# Patient Record
Sex: FEMALE | Race: WHITE | URBAN - METROPOLITAN AREA
[De-identification: names, ages, dates, MRNs, and addresses within clinical notes are randomized per-mention and may not be internally consistent; named-entity substitution may affect disease eponyms.]

---

## 2022-08-02 ENCOUNTER — OFFICE VISIT (OUTPATIENT)
Dept: ORTHOPEDIC SURGERY | Age: 82
End: 2022-08-02
Payer: MEDICARE

## 2022-08-02 VITALS — BODY MASS INDEX: 31.45 KG/M2 | WEIGHT: 156 LBS | HEIGHT: 59 IN

## 2022-08-02 DIAGNOSIS — Z96.651 S/P TOTAL KNEE ARTHROPLASTY, RIGHT: ICD-10-CM

## 2022-08-02 DIAGNOSIS — M25.562 LEFT KNEE PAIN, UNSPECIFIED CHRONICITY: Primary | ICD-10-CM

## 2022-08-02 DIAGNOSIS — M17.12 PRIMARY OSTEOARTHRITIS OF LEFT KNEE: ICD-10-CM

## 2022-08-02 PROBLEM — E55.9 VITAMIN D DEFICIENCY: Status: ACTIVE | Noted: 2021-12-20

## 2022-08-02 PROBLEM — I10 ESSENTIAL HYPERTENSION: Status: ACTIVE | Noted: 2018-10-09

## 2022-08-02 PROBLEM — M81.0 AGE-RELATED OSTEOPOROSIS WITHOUT CURRENT PATHOLOGICAL FRACTURE: Status: ACTIVE | Noted: 2021-06-17

## 2022-08-02 PROCEDURE — 1123F ACP DISCUSS/DSCN MKR DOCD: CPT | Performed by: ORTHOPAEDIC SURGERY

## 2022-08-02 PROCEDURE — 1036F TOBACCO NON-USER: CPT | Performed by: ORTHOPAEDIC SURGERY

## 2022-08-02 PROCEDURE — 99204 OFFICE O/P NEW MOD 45 MIN: CPT | Performed by: ORTHOPAEDIC SURGERY

## 2022-08-02 PROCEDURE — G8417 CALC BMI ABV UP PARAM F/U: HCPCS | Performed by: ORTHOPAEDIC SURGERY

## 2022-08-02 PROCEDURE — 1090F PRES/ABSN URINE INCON ASSESS: CPT | Performed by: ORTHOPAEDIC SURGERY

## 2022-08-02 PROCEDURE — G8400 PT W/DXA NO RESULTS DOC: HCPCS | Performed by: ORTHOPAEDIC SURGERY

## 2022-08-02 PROCEDURE — G8427 DOCREV CUR MEDS BY ELIG CLIN: HCPCS | Performed by: ORTHOPAEDIC SURGERY

## 2022-08-02 RX ORDER — OMEPRAZOLE 20 MG/1
20 CAPSULE, DELAYED RELEASE ORAL DAILY
COMMUNITY

## 2022-08-02 RX ORDER — FAMOTIDINE 20 MG/1
20 TABLET, FILM COATED ORAL DAILY
COMMUNITY

## 2022-08-02 RX ORDER — AMLODIPINE BESYLATE 5 MG/1
TABLET ORAL
COMMUNITY
Start: 2022-07-08

## 2022-08-02 RX ORDER — HYDROXYZINE HYDROCHLORIDE 25 MG/1
TABLET, FILM COATED ORAL
COMMUNITY
Start: 2022-07-15

## 2022-08-02 RX ORDER — ASCORBIC ACID 500 MG
500 TABLET ORAL DAILY
COMMUNITY

## 2022-08-02 RX ORDER — SIMVASTATIN 20 MG
20 TABLET ORAL
COMMUNITY

## 2022-08-02 RX ORDER — ESTRADIOL 0.1 MG/G
1 CREAM VAGINAL
COMMUNITY
Start: 2022-04-13 | End: 2022-10-10

## 2022-08-02 NOTE — PROGRESS NOTES
Name: Kira Garcia  YOB: 1940  Gender: female  MRN: [id number]    CC:   Chief Complaint   Patient presents with    Knee Pain     Left knee pain R TKA 7-5-2013         HPI: Kira Garcia is a 80 y.o. female with a PMHx of HTN, HLD, osteopenia/osteoporosis, and s/p right TKA in 2013  here for evaluation of left knee pain. The pain has been present for little over 8 months and is becoming worse. The pain is primarily on the Lateral side. she describes the pain as dull, aching, throbbing, intermittent catching, and intermittently feels a burning pain over the lateral side that radiates to about the mid shin  She is also complaining of warmth over the lateral side of the knee. She denies any redness, drainage, or any wounds  The pain is worse with going up and/or down stairs, rising after sitting, standing, and walking  she denies numbness and tingling down the leg. Patient states she had a cortisone shot in April of this year with her PCP that helped about 2 weeks  Treatment so far has been activity modification, Tylenol, ibuprofen, cortisone injections, and voltaren gel with little relief. Regarding the right knee she is still very pleased with her results thus far and has no new complaints regarding her knee. Allergies   Allergen Reactions    Lisinopril Cough    Losartan Cough    Rosuvastatin Myalgia    Simvastatin Myalgia         Review of Systems:  As per HPI. Pertinent positives and negatives are addressed with the patient, particularly those related to musculoskeletal concerns. Non-orthopaedic concerns were referred back to the primary care physician. PHYSICAL EXAMINATION:   The patient appears their stated age and they are in no distress. Ht 4' 11\" (1.499 m)   Wt 156 lb (70.8 kg)   BMI 31.51 kg/m²       The lower extremities are as described below. Circulation is normal with palpable pedal pulses bilaterally and no edema.   There is no lymph adenopathy in the popliteal or malleolar region. The skin is without stasis disease distally bilaterally. Sensation is intact to light touch bilaterally. There is mild tenderness to palpation over the lateral joint line of the left knee(s)  The gait is noted to be with a slight trendelenburg and antalgia  Range of motion is 0-120 degrees on the right and 0-95 degrees on the left. bilateral knee: There is 2mm of anterior/posterior translation and 2mm of medial/lateral instability bilaterally. There is 2 degrees of varus alignment in the bilateral knee. Limb lengths are equal.  Quadriceps strength is excellent. Their judgment and insight are normal.  They are oriented to time, place and person. Their memory is good and the mood and affect appropriate. X-RAY:   AP, lateral, sunrise, and 45 degree  views of the bilateral knees are reviewed. On the left knee there is some lateral joint space loss, with little eburnated bone, and osteophyte formation present. X-ray impression:  Moderate osteoarthropathy of the left knee  3 views of the right knee are reveal good bone prosthetic interface with no suggestion of progressive lucency. X-ray impression:  Stable right total knee arthroplasty      IMPRESSION:     ICD-10-CM    1. Left knee pain, unspecified chronicity  M25.562 XR Knee Bilateral Standard Extended VW      2. S/P total knee arthroplasty, right  Z96.651 XR Knee Bilateral Standard Extended VW      3. Primary osteoarthritis of left knee  M17.12 Ambulatory referral to Physical Therapy          RECOMMENDATIONS:    Reviewed x-ray findings with the patient. This patient's clinical history and physical exam is consistent with osteoarthritis of the left knee(s). We discussed the natural history of this condition as a degenerative process that causes inflammation of the joints due to a breakdown of cartilage, the hard, thick tissue that cushions the joints.  We discussed that osteoarthritis never completely resolves on its own and symptoms including pain, stiffness, and swelling may wax and wane as the condition progresses. She understands that conservative treatments typically include activity modification, NSAIDs and physical therapy. Oral and/or steroid injections into the joint could be considered in resistant scenarios. Viscosupplementation injections may also be useful as a temporary cartilage replacement in certain patients. I advised to avoid any prolonged bedrest and to try to maintain ADLs as much as possible. The patient was counseled to follow up with me should she develop any worsening symptoms that begin to limit activities of daily living.     ----The patient will be treated observantly with self directed symptomatic care. Instructions were given to follow up if there is any neurologic or functional decline.  ---- Physical Therapy was prescribed and will include stretching, strengthening and modalities to promote blood flow, flexibility and core strengthening.  ----TKA - Today we also discussed left knee replacement surgery. They are aware of the 1% risk of infection. They were also informed of the possibility of stroke, heart attack and blood clot; any of which could result in further hospitalization or death. I reviewed the procedure as well as the pre and post-operative process at length and written information was provided for further review. We are planning a Tanisha Makoplasty Robot Assisted TKA - The patient is aware that a preoperative 3D CT Scan is medically necessary for pre-op planning using the [employer]. This will be scheduled and arranged to be done prior to surgery.        4--this is a chronic illness/condition with exacerbation

## 2022-08-15 NOTE — PROGRESS NOTES
[address]  [address] [facility] [address]  Dept: [phone]      Physical Therapy Initial Assessment   Goes by [address]: Today is 1/20 visits [facility])    Total Timed Procedure Codes: 30 min, Total Time: 50 min    Referring MD: Alexis Dunaway., *  Referral for: Left knee pain due to OA  Onset date: 2/1/2022    Diagnosis:     ICD-10-CM    1. Chronic pain of left knee  M25.562     G89.29       2. Joint stiffness of left lower leg  M25.662       3. Left leg swelling  M79.89       4. Difficulty walking  R26.2       5. Impaired mobility and ADLs  Z74.09     Z78.9       6. Muscle weakness  M62.81              Therapy precautions:None  Co-morbidities affecting plan of care: Right TKA (2013), HTN, osteoporosis. PERTINENT MEDICAL HISTORY     Past medical and surgical history:   No past medical history on file. No past surgical history on file. Medications: reviewed in chart   Allergies: Allergies   Allergen Reactions    Lisinopril Cough    Losartan Cough    Rosuvastatin Myalgia    Simvastatin Myalgia          SUBJECTIVE     Chief complaints/history of injury: Patient presents to therapy with chronic left knee pain due to OA. She has TKA scheduled for January. She states she had cortisone injection in April with very little change in her symptoms. She has pain left knee. She has stiffness left knee. She uses a cane to ambulate outside of her home. She c/o swelling left knee. She does not recall any injury to left knee. Received previous outpatient therapy? No    Pain Assessment:  Pain location: left knee    Average Pain/symptom intensity (0-10 scale)  Last 24 hours: 6/10  Last week (1-7 days): 8/10  How often do you feel symptoms?  Constant (% of time)  Description: burning, sharp, and stabbing  Aggravating factors: prolonged standing, transferring sit-stand, walking, and lying in bed  Alleviating factors:  none    Neuro screen: denies numbness, tingling, and radiating pain    Social/Functional Hx: How would you rate your overall health? very good  Pt lives with independent spouse in a(n) 1 story house with ramped entrance. Current DME: straight cane  Work Status: Retired   Sleep: minimally disturbed  PLOF & Social Hx/Interests: Independent and active without physical limitations and participated in walking in park  How much have your symptoms interfered with daily activities? Quite a bit  Current level of function:  able to do household chores and grocery shopping with help from her spouse. She has to use cane to ambulate in park . She has increased pain left knee with activity. Patient Stated Goals: Independent with HEP. OBJECTIVE     Functional Outcome Questionnaire: Lower Extremity Functional Scale: 49/80= 61% function (39% deficit)  Observation:   Posture: Knee valgus left  Swelling/Edema: moderate left                Palpation: no pain with palpation      A/PROM Measures:     Right Left Comment   Knee Flexion 130A 110A    Knee Extension 0 0A                     Strength/MMT (0-5 Scale):      Right Left Comment   Knee Flexion 4 -3    Knee Extension 4 -3          Hip Flexion 4 +3    Hip Extension 4 +3    Hip Abduction 4 +3    Hip ER      Hip IR      Ankle DF      Ankle PF                  Special Tests/Function:   Gait: assistive device used: cane  antalgic left  Stair management:step-to leading right ascending, step-to leading left descending      Treatment provided today consisted of initial evaluation followed by: Therapeutic exercise (48448) x 20 min to address ROM/strength deficits and to develop an initial HEP as noted below. Included:   SAQ 20x  Quad sets 20x  SLR 10x2  Seated HS stretch  LAQ 20x  Step stretch for knee flexion 10x    Gait training (11229) x 10 min to address mechanics, proper step length and weight shifting to improve household and community mobility as well as overall safety with ADLs.  (Training with cane, stairs non-reicprocating and cane fitting)        Patient Education on the condition/pathology, involved anatomy, and exercise rationale. CLINICAL DECISION MAKING/ASSESSMENT     Personal Factors/co-morbidities affecting POC (1-2 Medium/3+High): coping styles/strategies  habits/routines  past/current experiences   Problem List: (1-2 Low/ 3 Medium/ 4+ High) Pain  Localized swelling/edema  ROM limitations  Hypermobility/instability  Strength deficits  Impaired transfers  Impaired gait  Decreased endurance/activity Tolerance  ADL/functional limitations/modifications  Restricted social activity  Restricted recreational participation  Decreased Lind with Home Exercise Program  Difficulty sleeping    Clinical decision making: low complexity with therapist able to easily and confidently determine and predict expectations and future outcomes for the POC. Prognosis: excellent   Benefits and precautions of treatment explained to patient. Renata Jett is a 80 y.o. female who presents to therapy today with stable clinical presentation (low complexity) related to left knee pain due to OA. Pt would benefit from skilled physical therapy services to address the deficits noted above for return to prior level of function. PLAN OF CARE     Effective Dates: 8/16/2022 TO 8/16/22. Frequency/Duration: 1-2 sessions in the next 30 Day(s)  Interventions may include but are not limited to: Therapeutic exercise to develop ROM, strength, endurance and flexibility  Therapeutic activities using dynamic activities to improve function  Gait training to address mechanics, proper step length and weight shifting to improve household and community mobility as well as overall safety with ADLs  Home exercise program (HEP) development  Patient is independent with HEP and discharged from PT    The referring physician has reviewed and approved this evaluation and plan of care as noted by the electronic signature attached to note.     GOALS Long term goals to be met by 8/16/22 : Independent with HEP-MET    Amplience  Access Code: [email or web address]  URL: [email or web address]  Date: 08/16/2022  Prepared by:  Sergo Ott    Exercises  Supine Quad Set - 1 x daily - 7 x weekly - 1 sets - 20 reps - hold 5 seconds hold  Supine Active Straight Leg Raise - 1 x daily - 7 x weekly - 2 sets - 10 reps - don't hold  Seated Long Arc Quad - 1 x daily - 7 x weekly - 1 sets - 20 reps - don't hold  Supine Knee Extension Strengthening - 1 x daily - 7 x weekly - 1 sets - 20 reps - don't hold  Standing Knee Flexion Stretch on Step - 2 x daily - 7 x weekly - 1 sets - 10 reps - 10 seconds hold  Seated Hamstring Stretch with Chair - 2 x daily - 7 x weekly - 1 sets - 1 reps - 1 minute hold

## 2022-08-16 ENCOUNTER — OFFICE VISIT (OUTPATIENT)
Dept: ORTHOPEDIC SURGERY | Age: 82
End: 2022-08-16
Payer: MEDICARE

## 2022-08-16 DIAGNOSIS — G89.29 CHRONIC PAIN OF LEFT KNEE: Primary | ICD-10-CM

## 2022-08-16 DIAGNOSIS — R26.2 DIFFICULTY WALKING: ICD-10-CM

## 2022-08-16 DIAGNOSIS — M25.662 JOINT STIFFNESS OF LEFT LOWER LEG: ICD-10-CM

## 2022-08-16 DIAGNOSIS — M25.562 CHRONIC PAIN OF LEFT KNEE: Primary | ICD-10-CM

## 2022-08-16 DIAGNOSIS — M62.81 MUSCLE WEAKNESS: ICD-10-CM

## 2022-08-16 DIAGNOSIS — M79.89 LEFT LEG SWELLING: ICD-10-CM

## 2022-08-16 DIAGNOSIS — Z74.09 IMPAIRED MOBILITY AND ADLS: ICD-10-CM

## 2022-08-16 DIAGNOSIS — Z78.9 IMPAIRED MOBILITY AND ADLS: ICD-10-CM

## 2022-08-16 PROCEDURE — 97110 THERAPEUTIC EXERCISES: CPT | Performed by: PHYSICAL THERAPIST

## 2022-08-16 PROCEDURE — 97161 PT EVAL LOW COMPLEX 20 MIN: CPT | Performed by: PHYSICAL THERAPIST

## 2022-08-16 PROCEDURE — 97116 GAIT TRAINING THERAPY: CPT | Performed by: PHYSICAL THERAPIST

## 2022-11-17 DIAGNOSIS — M17.12 PRIMARY OSTEOARTHRITIS OF LEFT KNEE: Primary | ICD-10-CM

## 2022-12-16 RX ORDER — SODIUM CHLORIDE 0.9 % (FLUSH) 0.9 %
5-40 SYRINGE (ML) INJECTION EVERY 12 HOURS SCHEDULED
OUTPATIENT
Start: 2022-12-16

## 2022-12-16 RX ORDER — SODIUM CHLORIDE 0.9 % (FLUSH) 0.9 %
5-40 SYRINGE (ML) INJECTION PRN
OUTPATIENT
Start: 2022-12-16

## 2022-12-16 RX ORDER — SODIUM CHLORIDE 9 MG/ML
INJECTION, SOLUTION INTRAVENOUS PRN
OUTPATIENT
Start: 2022-12-16

## 2022-12-16 RX ORDER — ACETAMINOPHEN 500 MG
1000 TABLET ORAL ONCE
OUTPATIENT
Start: 2022-12-16 | End: 2022-12-16

## 2022-12-20 ENCOUNTER — HOSPITAL ENCOUNTER (OUTPATIENT)
Dept: SURGERY | Age: 82
Discharge: HOME | End: 2022-12-23
Payer: MEDICARE

## 2022-12-20 ENCOUNTER — HOSPITAL ENCOUNTER (OUTPATIENT)
Dept: REHABILITATION | Age: 82
Discharge: HOME | End: 2022-12-23
Payer: MEDICARE

## 2022-12-20 VITALS
OXYGEN SATURATION: 94 % | HEART RATE: 88 BPM | DIASTOLIC BLOOD PRESSURE: 58 MMHG | BODY MASS INDEX: 28.47 KG/M2 | SYSTOLIC BLOOD PRESSURE: 153 MMHG | TEMPERATURE: 97.7 F | HEIGHT: 60 IN | WEIGHT: 145 LBS

## 2022-12-20 DIAGNOSIS — M17.12 PRIMARY OSTEOARTHRITIS OF LEFT KNEE: Primary | ICD-10-CM

## 2022-12-20 LAB
ANION GAP SERPL CALC-SCNC: 5 MMOL/L (ref 2–11)
APTT PPP: 37.6 SEC (ref 24.5–34.2)
BACTERIA SPEC CULT: NORMAL
BASOPHILS # BLD: 0 K/UL (ref 0–0.2)
BASOPHILS NFR BLD: 0 % (ref 0–2)
BUN SERPL-MCNC: 7 MG/DL (ref 8–23)
CALCIUM SERPL-MCNC: 10.1 MG/DL (ref 8.3–10.4)
CHLORIDE SERPL-SCNC: 105 MMOL/L (ref 101–110)
CO2 SERPL-SCNC: 28 MMOL/L (ref 21–32)
CREAT SERPL-MCNC: 0.61 MG/DL (ref 0.6–1)
DIFFERENTIAL METHOD BLD: NORMAL
EKG ATRIAL RATE: 74 BPM
EKG DIAGNOSIS: NORMAL
EKG P AXIS: 64 DEGREES
EKG P-R INTERVAL: 194 MS
EKG Q-T INTERVAL: 404 MS
EKG QRS DURATION: 70 MS
EKG QTC CALCULATION (BAZETT): 448 MS
EKG R AXIS: -42 DEGREES
EKG T AXIS: 35 DEGREES
EKG VENTRICULAR RATE: 74 BPM
EOSINOPHIL # BLD: 0.1 K/UL (ref 0–0.8)
EOSINOPHIL NFR BLD: 1 % (ref 0.5–7.8)
ERYTHROCYTE [DISTWIDTH] IN BLOOD BY AUTOMATED COUNT: 13.2 % (ref 11.9–14.6)
EST. AVERAGE GLUCOSE BLD GHB EST-MCNC: 108 MG/DL
GLUCOSE SERPL-MCNC: 101 MG/DL (ref 65–100)
HBA1C MFR BLD: 5.4 % (ref 4.8–5.6)
HCT VFR BLD AUTO: 42.2 % (ref 35.8–46.3)
HGB BLD-MCNC: 13.6 G/DL (ref 11.7–15.4)
IMM GRANULOCYTES # BLD AUTO: 0 K/UL (ref 0–0.5)
IMM GRANULOCYTES NFR BLD AUTO: 0 % (ref 0–5)
INR PPP: 0.9
LYMPHOCYTES # BLD: 1.4 K/UL (ref 0.5–4.6)
LYMPHOCYTES NFR BLD: 19 % (ref 13–44)
MCH RBC QN AUTO: 30.6 PG (ref 26.1–32.9)
MCHC RBC AUTO-ENTMCNC: 32.2 G/DL (ref 31.4–35)
MCV RBC AUTO: 95 FL (ref 82–102)
MONOCYTES # BLD: 0.5 K/UL (ref 0.1–1.3)
MONOCYTES NFR BLD: 7 % (ref 4–12)
NEUTS SEG # BLD: 5.4 K/UL (ref 1.7–8.2)
NEUTS SEG NFR BLD: 73 % (ref 43–78)
NRBC # BLD: 0 K/UL (ref 0–0.2)
PLATELET # BLD AUTO: 278 K/UL (ref 150–450)
PMV BLD AUTO: 11.2 FL (ref 9.4–12.3)
POTASSIUM SERPL-SCNC: 3.7 MMOL/L (ref 3.5–5.1)
PROTHROMBIN TIME: 12.4 SEC (ref 12.6–14.3)
RBC # BLD AUTO: 4.44 M/UL (ref 4.05–5.2)
SERVICE CMNT-IMP: NORMAL
SODIUM SERPL-SCNC: 138 MMOL/L (ref 133–143)
WBC # BLD AUTO: 7.3 K/UL (ref 4.3–11.1)

## 2022-12-20 PROCEDURE — 93005 ELECTROCARDIOGRAM TRACING: CPT | Performed by: ANESTHESIOLOGY

## 2022-12-20 PROCEDURE — 97161 PT EVAL LOW COMPLEX 20 MIN: CPT

## 2022-12-20 PROCEDURE — 94760 N-INVAS EAR/PLS OXIMETRY 1: CPT

## 2022-12-20 PROCEDURE — 85025 COMPLETE CBC W/AUTO DIFF WBC: CPT

## 2022-12-20 PROCEDURE — 80048 BASIC METABOLIC PNL TOTAL CA: CPT

## 2022-12-20 PROCEDURE — 83036 HEMOGLOBIN GLYCOSYLATED A1C: CPT

## 2022-12-20 PROCEDURE — 85730 THROMBOPLASTIN TIME PARTIAL: CPT

## 2022-12-20 PROCEDURE — 87641 MR-STAPH DNA AMP PROBE: CPT

## 2022-12-20 PROCEDURE — 85610 PROTHROMBIN TIME: CPT

## 2022-12-20 RX ORDER — ASPIRIN 81 MG/1
81 TABLET ORAL DAILY
COMMUNITY

## 2022-12-20 RX ORDER — TRIAMCINOLONE ACETONIDE 1 MG/G
CREAM TOPICAL AS NEEDED
COMMUNITY

## 2022-12-20 ASSESSMENT — KOOS JR
RISING FROM SITTING: 3
HOW SEVERE IS YOUR KNEE STIFFNESS AFTER FIRST WAKING IN MORNING: 3
BENDING TO THE FLOOR TO PICK UP OBJECT: 2
KOOS JR TOTAL INTERVAL SCORE: 39.625
TWISING OR PIVOTING ON KNEE: 3
GOING UP OR DOWN STAIRS: 3
STRAIGHTENING KNEE FULLY: 3
STANDING UPRIGHT: 2

## 2022-12-20 ASSESSMENT — PAIN DESCRIPTION - ORIENTATION: ORIENTATION: LEFT

## 2022-12-20 ASSESSMENT — PULMONARY FUNCTION TESTS
FEV1 (LITERS): 0.88
FEV1 (%PREDICTED): 63

## 2022-12-20 ASSESSMENT — PAIN SCALES - GENERAL: PAINLEVEL_OUTOF10: 8

## 2022-12-20 ASSESSMENT — PAIN DESCRIPTION - LOCATION: LOCATION: KNEE

## 2022-12-20 NOTE — PERIOP NOTE
Labs done today within anesthesia protocols - except PTT. Will have anesthesia review.       Latest Reference Range & Units 12/20/22 12:25   Sodium 133 - 143 mmol/L 138   Potassium 3.5 - 5.1 mmol/L 3.7   Chloride 101 - 110 mmol/L 105   CO2 21 - 32 mmol/L 28   BUN,BUNPL 8 - 23 MG/DL 7 (L)   Creatinine 0.6 - 1.0 MG/DL 0.61   Anion Gap 2 - 11 mmol/L 5   Est, Glom Filt Rate >60 ml/min/1.73m2 >60   Glucose, Random 65 - 100 mg/dL 101 (H)   CALCIUM, SERUM, 477409 8.3 - 10.4 MG/DL 10.1   Hemoglobin A1C 4.8 - 5.6 % 5.4   eAG (mg/dL) mg/dL 108   WBC 4.3 - 11.1 K/uL 7.3   RBC 4.05 - 5.2 M/uL 4.44   Hemoglobin Quant 11.7 - 15.4 g/dL 13.6   Hematocrit 35.8 - 46.3 % 42.2   MCV 82.0 - 102.0 FL 95.0   MCH 26.1 - 32.9 PG 30.6   MCHC 31.4 - 35.0 g/dL 32.2   MPV 9.4 - 12.3 FL 11.2   RDW 11.9 - 14.6 % 13.2   Platelet Count 412 - 450 K/uL 278   Absolute Mono # 0.1 - 1.3 K/UL 0.5   Eosinophils % 0.5 - 7.8 % 1   Basophils Absolute 0.0 - 0.2 K/UL 0.0   Differential Type -   AUTOMATED   Seg Neutrophils 43 - 78 % 73   Segs Absolute 1.7 - 8.2 K/UL 5.4   Lymphocytes 13 - 44 % 19   Absolute Lymph # 0.5 - 4.6 K/UL 1.4   Monocytes 4.0 - 12.0 % 7   Absolute Eos # 0.0 - 0.8 K/UL 0.1   Basophils 0.0 - 2.0 % 0   Immature Granulocytes 0.0 - 5.0 % 0   Nucleated Red Blood Cells 0.0 - 0.2 K/uL 0.00   Absolute Immature Granulocyte 0.0 - 0.5 K/UL 0.0   Prothrombin Time 12.6 - 14.3 sec 12.4 (L)   INR -   0.9   PTT 24.5 - 34.2 SEC 37.6 (H)   MSSA/MRSA SCREEN BY PCR  Rpt   (L): Data is abnormally low  (H): Data is abnormally high  Rpt: View report in Results Review for more information

## 2022-12-20 NOTE — PERIOP NOTE
Patient verified name and . Order for consent is found in EHR and matches case posting; patient verified. Type 3 surgery, Joint camp assessment complete. Labs per surgeon: CBC,BMP, PT/PTT, mrsa swab, hgba1c ; results (processing)  Labs per anesthesia protocol: no additional  EKG:done today - will have anesthesia review. MRSA/MSSA swab collected; pharmacy to review and dose antibiotic as appropriate. Hospital approved surgical skin cleanser and instructions to return bottle on DOS given per hospital policy. Patient provided with handouts including Guide to Surgery, Pain Management, Hand Hygiene, Blood Transfusion Education, and [facility] Brochure. Patient answered medical/surgical history questions at their best of ability. All prior to admission medications documented in [facility]. Original medication prescription bottle not visualized during patient appointment. Patient instructed to hold all vitamins 3 weeks prior to surgery and NSAIDS 5 days prior to surgery. Patient teach back successful and patient demonstrates knowledge of instruction.

## 2022-12-20 NOTE — PERIOP NOTE
PLEASE CONTINUE TAKING ALL PRESCRIPTION MEDICATIONS UP TO THE DAY OF SURGERY UNLESS OTHERWISE DIRECTED BELOW. DISCONTINUE all vitamins, herbals and supplements 21 days prior to surgery. DISCONTINUE Non-Steriodal Anti-Inflammatory (NSAIDS) such as Advil, Ibuprofen, Motrin, Naproxen and Aleve 5 days prior to surgery. Home Medications to take  the day of surgery      Amlodipine     Pepcid if needed     Hydroxyzine if needed     Home Medications   to Hold     Hold Aspirin for 5 days prior to surgery. Comments   Take 1 capful of Miralax daily starting one week prior to surgery. On the day before surgery please take Acetaminophen 1000mg in the morning and then again before bed. You may substitute for Tylenol 650 mg. Please do not bring home medications with you on the day of surgery unless otherwise directed by your nurse. If you are instructed to bring home medications, please give them to your nurse as they will be administered by the nursing staff. If you have any questions, please call Shirlene Gurrola [phone]. A copy of this note was provided to the patient for reference.

## 2022-12-20 NOTE — PROGRESS NOTES
Katelynn Ball  : 1940  Primary: Medicare Part A [employer] B  Secondary: [address] at [facility]  [address], [place] [address] [place]  [phone]        Physical Therapy Prehab Evaluation Summary:2022   Time In/Out   PT Charge Capture  Episode     MEDICAL/REFERRING DIAGNOSIS: Unilateral primary osteoarthritis, left knee [M17.12]  REFERRING PHYSICIAN: Fabiola Sandhoff., *    ICD-10: Treatment Diagnosis:   Pain in Left Knee (M25.562)  Stiffness of Left Knee, Not elsewhere classified (M25.662)  Difficulty in walking, Not elsewhere classified (R26.2)  Other abnormalities of gait and mobility (R26.89)    DATE OF SURGERY: 23  Assessment:   COMMENTS:  pt presents with granddaughter with pain in left knee plans to return home with  post op day 1. Pt had right tka . PROBLEM LIST:   (Impacting functional limitations):  Ms. Yesica Gomez presents with the following lower extremity(s) problems:  Transfers  Gait  Strength  Range of Motion  Balance  Home Exercise Program  Pain INTERVENTIONS PLANNED:   (Benefits and precautions of physical therapy have been discussed with the patient.)  Home Exercise Program  Educational Discussion       GOALS: (Goals have been discussed and agreed upon with patient.)  Discharge Goals: Time Frame: 1 Day  Patient will demonstrate independence with a home exercise program designed to increase strength, range of motion, balance, coordination, functional technique, and pain control to minimize functional deficits and optimize patient for total joint replacement. Subjective:   Past Medical History/Comorbidities:   Ms. Yesica Gomez  has a past medical history of HTN (hypertension), Hyperlipidemia, and Urinary incontinence. Ms. Yesica Gomez  has a past surgical history that includes Appendectomy; Hysterectomy (Bilateral); Foot surgery (Bilateral); Knee Arthroplasty (Right);  Tonsillectomy; Colonoscopy; and Cardiac catheterization.     Allergies:  Lisinopril, Losartan, Rosuvastatin, and Simvastatin    Current Medications:  Refer to pre-assessment nursing note    Home Set-Up/Prior Level of Function:  Lives With: Spouse  Home Layout: One level  Home Access: Ramped entrance  Bathroom Shower/Tub: Tub/Shower unit  Home Equipment: Donney Proffer, rolling    Dominant Side:  Dominant Hand: : Right    Current Functional Status:   Ambulation:  [x] Independent  [] Walk Indoors Only  [] Walk Outdoors  [] Use Assistive Device  [] Use Wheelchair Only Dressing:  [x] [address] from Someone for:  [] Sock/Shoes  [] Pants  [] Everything   Bathing/Showering:   [x] Independent  [] Requires Assistance from Someone  [] Sponge Bath Only Household Activities:  [x] Routine house and yard work  [] Light Housework Only  [] None     Objective:   PAIN:   Pre-Treatment Pain  Pain Assessment: 0-10  Pain Level: 8  Pain Location: Knee  Pain Orientation: Left    Post Treatment: 8    Outcome Measure:   HOOS-JR:       KOOS-JR:  KOJr. LISANDRO Knee Survey Score: 19    LOWER EXTREMITY GROSS EVALUATION:  RIGHT LE   Within Functional Limits   Abnormal   Comments   Strength [x] []     Range of Motion [x] []        LEFT LE Within Functional Limits   Abnormal   Comments   Strength [x] []     Range of Motion [] []   [email or web address]     UPPER EXTREMITY GROSS EVALUATION:     Within Functional Limits   Abnormal   Comments   Strength [x] []    Range of Motion [x] []      SENSATION  Sensation [x]       COGNITION/  PERCEPTION: Intact Impaired (Comments):   Orientation [x]     Vision [x]     Hearing [x]     Cognition  [x]       TRANSFERS: I Mod I S SBA CGA Min Mod Max Total  NT x2 Comments:   Sit to Stand [x] [] [] [] [] [] [] [] [] [] []    Stand to Sit [x] [] [] [] [] [] [] [] [] [] []    Stand pivot [] [] [] [] [] [] [] [] [] [] []     [] [] [] [] [] [] [] [] [] [] []    I=Independent, Mod I=Modified Independent, S=Supervision, SBA=Standby Assistance, CGA=Contact Juan Schwab,   Min=Minimal Assistance, Mod=Moderate Assistance, Max=Maximal Assistance, Total=Total Assistance, NT=Not Tested    BALANCE: Good Fair+ Fair Fair- Poor NT Comments   Sitting Static [x] [] [] [] [] []    Sitting Dynamic [x] [] [] [] [] []              Standing Static [x] [] [] [] [] []    Standing Dynamic [x] [] [] [] [] []      Postural Assessment:   N/A    GAIT: I Mod I S SBA CGA Min Mod Max Total  NT x2 Comments:   Level of Assistance [x] [] [] [] [] [] [] [] [] [] []    Weightbearing Status       Distance  300 feet    Gait Quality Antalgic    DME None     Stairs      Ramp     I=Independent, Mod I=Modified Independent, S=Supervision, SBA=Standby Assistance, CGA=Contact Guard Assistance,   Min=Minimal Assistance, Mod=Moderate Assistance, Max=Maximal Assistance, Total=Total Assistance, NT=Not Tested    TREATMENT:   EVALUATION: LOW COMPLEXITY: (Untimed Charge)    TREATMENT PLAN:   Effective Dates: 12/20/2022 TO 12/20/2022. Treatment/Session Assessment:  Patient was instructed in PT- HEP to increase strength and ROM in LEs. Answered all questions. Frequency/Duration: Patient to continue to perform home exercise program at least twice per day up until her surgery. EDUCATION: Education Given To: Patient  Education Provided: Role of Therapy, Plan of Care, Home Exercise Program, Transfer Training  Education Method: Demonstration, Verbal  Education Outcome: Verbalized understanding, Demonstrated understanding    MEDICAL NECESSITY: Ms. Yas Fong is expected to optimize herlower extremity strength and ROM in preparation for joint replacement surgery. REASON FOR CONTINUED SERVICES: Achieve baseline assesment of musculoskeletal system, functional mobility and home environment. , educate in PT HEP in preparation for surgery, educate in hospital plan of care. COMPLIANCE WITH PROGRAM/EXERCISE: compliant most of the time, Will assess as treatment progresses.     TOTAL TREATMENT DURATION:   8400-9081    Regarding Annamarie Heard's therapy, I certify that the treatment plan above will be carried out by a therapist or under their direction.   Thank you for this referral,  Kenneth Figueroa, PT

## 2022-12-20 NOTE — PROGRESS NOTES
22 1300   Treatment   Treatment Type Bedside spirometry   Oxygen Therapy/Pulse Ox   O2 Therapy Room air   Heart Rate 88   SpO2 94 %   Pulse Oximeter Device Mode Intermittent   $Pulse Oximeter $Spot check (single)   Bedside Spirometry   FEV-1/Actual (Liters) 0.88 Liters   FEV-1/Predicted (Liters) 63 Liters   Initial respiratory Assessment completed with pt. Pt was interviewed and evaluated in [facility] camp prior to surgery. Patient ID:  Caitlin Sosa  [id number]  36 y.o.  1940  Surgeon: Dr. Haylee Velazquez  Date of Surgery: [email or web address]  Procedure: Total Left Knee Arthroplasty  Primary Care Physician: Paco Salgado [phone]  Specialists:     BS:DIMINSHED      Pt taught proper COUGH technique  IS REVIEWED WITH PT AS WELL AS BENEFITS OF USING IS IN SEDENTARY PTS. DIAPHRAGMATIC BREATHING EXERCISE INSTRUCTIONS GIVEN    History of smokin PPD FOR 30 YEARS                 Quit date:         Secondhand smoke:PARENTS    Past procedures with Oxygen desaturation or delayed awakening:DENIES    Past Medical History:   Diagnosis Date    HTN (hypertension)     Hyperlipidemia     Urinary incontinence       HX OF COVID  RLL OPACITY / ATELECTASIS 9/3/2014  Respiratory history:DENIES SOB                                                                    Respiratory meds:  DENIES    FAMILY PRESENT:            DAUGHTER    PAST SLEEP STUDY:                           DENIES  HX OF STEPHANIE:                                        DENIES  STEPHANIE assessment:     DANGERS OF UNTREATED STEPHANIE EXPLAINED TO PT.                                          SLEEPS ON SIDE        PHYSICAL EXAM   Body mass index is 28.32 kg/m².    Vitals:    22 1300   BP:    Pulse: (P) 88   Temp:    SpO2: (P) 94%     Neck circumference:  34    cm    Loud snoring:                                                 YES           Witnessed apnea or wakening gasping or choking:        DENIES        Awakens with headaches: DENIES  Morning or daytime tiredness/ sleepiness:                     SOME     TIRED  Dry mouth or sore throat in morning:                      DENIES                                   Salgado stage:  4                                   SACS score:4  Stop Bang                                CS HS  RESPIRATORY ASSESSMENT Q SHIFT   O2 PRN    ALBUTEROL  NEBULIZER Q6 PRN WHEEZING                                               Referrals:    Pt. Phone Number:

## 2023-01-04 DIAGNOSIS — M17.12 PRIMARY OSTEOARTHRITIS OF LEFT KNEE: Primary | ICD-10-CM

## 2023-01-04 NOTE — PROGRESS NOTES
Total Joint Surgery Preoperative Chart Review      Patient ID:  Nicholas James  [id number]  9 y.o.  1940  Surgeon: Dr. Crow Arreaga  Date of Surgery: 1/20/2023  Procedure: Total Left Knee Arthroplasty  Primary Care Physician: Felecia Sarabia [phone]  Specialty Physician(s):      Subjective:   Nicholas James is a 80 y.o. White (non-) female who presents for preoperative evaluation for Total Left Knee arthroplasty. This is a preoperative chart review note based on data collected by the nurse at the surgical Pre-Assessment visit. Past Medical History:   Diagnosis Date    HTN (hypertension)     Hyperlipidemia     Urinary incontinence       Past Surgical History:   Procedure Laterality Date    APPENDECTOMY      CARDIAC CATHETERIZATION      no stents    COLONOSCOPY      FOOT SURGERY Bilateral     heel spurs    HYSTERECTOMY (CERVIX STATUS UNKNOWN) Bilateral     KNEE ARTHROPLASTY Right     TONSILLECTOMY       History reviewed. No pertinent family history. Social History     Tobacco Use    Smoking status: Former     Types: Cigarettes    Smokeless tobacco: Never    Tobacco comments:     Quit 2006   Substance Use Topics    Alcohol use: Never       Prior to Admission medications    Medication Sig Start Date End Date Taking? Authorizing Provider   triamcinolone (KENALOG) 0.1 % cream Apply topically as needed Apply topically 2 times daily. Yes Historical Provider, MD   aspirin 81 MG EC tablet Take 81 mg by mouth daily   Yes Historical Provider, MD   CALCIUM PO Take by mouth   Yes Historical Provider, MD   amLODIPine (NORVASC) 5 MG tablet TAKE 1 TABLET BY MOUTH EVERY DAY 7/8/22   Historical Provider, MD   ascorbic acid (VITAMIN C) 500 MG tablet Take 500 mg by mouth in the morning. Historical Provider, MD   vitamin D (CHOLECALCIFEROL) 25 MCG (1000 UT) TABS tablet 2,000 Units  in the morning.     Historical Provider, MD   estradiol (ESTRACE) 0.1 MG/GM vaginal cream Place 1 g vaginally 4/13/22 10/10/22  Historical Provider, MD   famotidine (PEPCID) 20 MG tablet Take 20 mg by mouth nightly as needed    Historical Provider, MD   hydrOXYzine HCl (ATARAX) 25 MG tablet TAKE 1 TABLET (25 MG) BY MOUTH DAILY AS NEEDED FOR ITCHING 7/15/22   Historical Provider, MD   omeprazole (PRILOSEC) 20 MG delayed release capsule Take 20 mg by mouth in the morning. Patient not taking: Reported on 12/20/2022    Historical Provider, MD   simvastatin (ZOCOR) 20 MG tablet Take 20 mg by mouth  Patient not taking: Reported on 12/20/2022    Historical Provider, MD     Allergies   Allergen Reactions    Lisinopril Cough    Losartan Cough    Rosuvastatin Myalgia    Simvastatin Myalgia          Objective:     Physical Exam:   No data found. ECG:    No results found for this or any previous visit (from the past 4464 hour(s)). Data Review:   Labs:   No results found for this or any previous visit (from the past 24 hour(s)). Problem List:  )  Patient Active Problem List   Diagnosis    Chest pain of uncertain etiology    S/P total knee replacement using cement    Hiatal hernia    Hyperlipemia    GERD (gastroesophageal reflux disease)    Osteoarthritis of right knee    Age-related osteoporosis without current pathological fracture    Essential hypertension    Urge incontinence of urine    Vitamin D deficiency    Primary osteoarthritis of left knee       Total Joint Surgery Pre-Assessment Recommendations:           Continuous saturation monitoring during hospitalization. O2 prn per respiratory protocol.      Signed By: JESSICA Dimas - CNP-C    January 4, 2023

## 2023-01-10 DIAGNOSIS — M17.12 PRIMARY OSTEOARTHRITIS OF LEFT KNEE: ICD-10-CM

## 2023-01-11 ENCOUNTER — OFFICE VISIT (OUTPATIENT)
Dept: ORTHOPEDIC SURGERY | Age: 83
End: 2023-01-11

## 2023-01-11 DIAGNOSIS — M17.12 PRIMARY OSTEOARTHRITIS OF LEFT KNEE: Primary | ICD-10-CM

## 2023-01-11 NOTE — H&P (VIEW-ONLY)
[id number] [facility]  Pre Operative History and Physical Exam    Patient ID:  John Dailey  [id number]  42 y.o.  1940    Today: January 11, 2023           CC:  left knee pain    HPI:   The patient has  OA left knee. The patient was evaluated and examined during a consultation prior to this office visit. There have been no changes to the patient's orthopedic condition since the initial consultation. The patient has failed previous conservative treatment for this condition including antiinflammatories , and lifestyle modifications. The necessity for joint replacement is present. The patient will be admitted the day of surgery for left knee replacement. Past Medical/Surgical History:  Past Medical History:   Diagnosis Date    HTN (hypertension)     Hyperlipidemia     Urinary incontinence      Past Surgical History:   Procedure Laterality Date    APPENDECTOMY      CARDIAC CATHETERIZATION      no stents    COLONOSCOPY      FOOT SURGERY Bilateral     heel spurs    HYSTERECTOMY (CERVIX STATUS UNKNOWN) Bilateral     KNEE ARTHROPLASTY Right     TONSILLECTOMY          Allergies: Allergies   Allergen Reactions    Lisinopril Cough    Losartan Cough    Rosuvastatin Myalgia    Simvastatin Myalgia        Physical Exam:   General: NAD, Alert, Oriented, Appears their stated age     [de-identified]: NC/AT    Skin: No rashes, lesions or wounds seen      Psych: normal affect      Heart: Regular Rate, Rhythm     Lungs: unlabored respirations, no wheezing    Abdomen: Soft and non-distended     Ortho: Pain with limited ROM of the left knee    Neuro: no focal defects, moving extremities equally    Lymph: no lymphadenopathy     Meds:   Current Outpatient Medications   Medication Sig    triamcinolone (KENALOG) 0.1 % cream Apply topically as needed Apply topically 2 times daily.     aspirin 81 MG EC tablet Take 81 mg by mouth daily    CALCIUM PO Take by mouth    amLODIPine (NORVASC) 5 MG tablet TAKE 1 TABLET BY MOUTH EVERY DAY    ascorbic acid (VITAMIN C) 500 MG tablet Take 500 mg by mouth in the morning. vitamin D (CHOLECALCIFEROL) 25 MCG (1000 UT) TABS tablet 2,000 Units  in the morning. estradiol (ESTRACE) 0.1 MG/GM vaginal cream Place 1 g vaginally    famotidine (PEPCID) 20 MG tablet Take 20 mg by mouth nightly as needed    hydrOXYzine HCl (ATARAX) 25 MG tablet TAKE 1 TABLET (25 MG) BY MOUTH DAILY AS NEEDED FOR ITCHING    omeprazole (PRILOSEC) 20 MG delayed release capsule Take 20 mg by mouth in the morning. (Patient not taking: Reported on 12/20/2022)    simvastatin (ZOCOR) 20 MG tablet Take 20 mg by mouth (Patient not taking: Reported on 12/20/2022)     No current facility-administered medications for this visit. Labs:  Hospital Outpatient Visit on 12/20/2022   Component Date Value Ref Range Status    Special Requests 12/20/2022 NO SPECIAL REQUESTS    Final    Culture 12/20/2022 SA target not detected. A MRSA NEGATIVE, SA NEGATIVE test result does not preclude MRSA or SA nasal colonization.     Final    Protime 12/20/2022 12.4 (A)  12.6 - 14.3 sec Final    INR 12/20/2022 0.9    Final    Comment: Suggested therapeutic INR range:  Venous thrombosis and embolus  2.0-3.0  Prosthetic heart valve         2.5-3.5  ** Note new reference range and method **      Hemoglobin A1C 12/20/2022 5.4  4.8 - 5.6 % Final    eAG 12/20/2022 108  mg/dL Final    Comment: Reference Range  Normal: 4.8-5.6  Diabetic >=6.5%  Normal       <5.7%      WBC 12/20/2022 7.3  4.3 - 11.1 K/uL Final    RBC 12/20/2022 4.44  4.05 - 5.2 M/uL Final    Hemoglobin 12/20/2022 13.6  11.7 - 15.4 g/dL Final    Hematocrit 12/20/2022 42.2  35.8 - 46.3 % Final    MCV 12/20/2022 95.0  82.0 - 102.0 FL Final    MCH 12/20/2022 30.6  26.1 - 32.9 PG Final    MCHC 12/20/2022 32.2  31.4 - 35.0 g/dL Final    RDW 12/20/2022 13.2  11.9 - 14.6 % Final    Platelets 29/11/2963 278  150 - 450 K/uL Final    MPV 12/20/2022 11.2  9.4 - 12.3 FL Final nRBC 12/20/2022 0.00  0.0 - 0.2 K/uL Final    **Note: Absolute NRBC parameter is now reported with Hemogram**    Differential Type 12/20/2022 AUTOMATED    Final    Seg Neutrophils 12/20/2022 73  43 - 78 % Final    Lymphocytes 12/20/2022 19  13 - 44 % Final    Monocytes 12/20/2022 7  4.0 - 12.0 % Final    Eosinophils % 12/20/2022 1  0.5 - 7.8 % Final    Basophils 12/20/2022 0  0.0 - 2.0 % Final    Immature Granulocytes 12/20/2022 0  0.0 - 5.0 % Final    Segs Absolute 12/20/2022 5.4  1.7 - 8.2 K/UL Final    Absolute Lymph # 12/20/2022 1.4  0.5 - 4.6 K/UL Final    Absolute Mono # 12/20/2022 0.5  0.1 - 1.3 K/UL Final    Absolute Eos # 12/20/2022 0.1  0.0 - 0.8 K/UL Final    Basophils Absolute 12/20/2022 0.0  0.0 - 0.2 K/UL Final    Absolute Immature Granulocyte 12/20/2022 0.0  0.0 - 0.5 K/UL Final    Sodium 12/20/2022 138  133 - 143 mmol/L Final    Potassium 12/20/2022 3.7  3.5 - 5.1 mmol/L Final    Chloride 12/20/2022 105  101 - 110 mmol/L Final    CO2 12/20/2022 28  21 - 32 mmol/L Final    Anion Gap 12/20/2022 5  2 - 11 mmol/L Final    Glucose 12/20/2022 101 (A)  65 - 100 mg/dL Final    BUN 12/20/2022 7 (A)  8 - 23 MG/DL Final    Creatinine 12/20/2022 0.61  0.6 - 1.0 MG/DL Final    Est, Glom Filt Rate 12/20/2022 >60  >60 ml/min/1.73m2 Final    Comment:      Pediatric calculator link: Janusz.at. org/professionals/kdoqi/gfr_calculatorped       These results are not intended for use in patients <25years of age. eGFR results are calculated without a race factor using  the 2021 CKD-EPI equation. Careful clinical correlation is recommended, particularly when comparing to results calculated using previous equations. The CKD-EPI equation is less accurate in patients with extremes of muscle mass, extra-renal metabolism of creatinine, excessive creatine ingestion, or following therapy that affects renal tubular secretion.       Calcium 12/20/2022 10.1  8.3 - 10.4 MG/DL Final    PTT 12/20/2022 37.6 (A)  24.5 - 34.2 SEC Final    Comment: Heparin Therapeutic Range = 74 - 123 seconds  In addition to factor deficiency, monitoring heparin therapy, etc., evaluation of a prolonged aPTT result should include consideration of preanalytic variables such as heparin flush contamination, specimen integrity issues, etc.      Ventricular Rate 12/20/2022 74  BPM Final    Atrial Rate 12/20/2022 74  BPM Final    P-R Interval 12/20/2022 194  ms Final    QRS Duration 12/20/2022 70  ms Final    Q-T Interval 12/20/2022 404  ms Final    QTc Calculation (Bazett) 12/20/2022 448  ms Final    P Axis 12/20/2022 64  degrees Final    R Axis 12/20/2022 -42  degrees Final    T Axis 12/20/2022 35  degrees Final    Diagnosis 12/20/2022 Normal sinus rhythm   Final                 Patient Active Problem List   Diagnosis    Chest pain of uncertain etiology    S/P total knee replacement using cement    Hiatal hernia    Hyperlipemia    GERD (gastroesophageal reflux disease)    Osteoarthritis of right knee    Age-related osteoporosis without current pathological fracture    Essential hypertension    Urge incontinence of urine    Vitamin D deficiency    Primary osteoarthritis of left knee         Assessment:   1. OA left knee      Plan:    1. Proceed with scheduled left knee replacement         All material risks, benefits, and reasonable alternatives including postponing the procedure were discussed. The patient does wish to proceed with the procedure at this time.

## 2023-01-11 NOTE — PROGRESS NOTES
[id number] [facility]  Pre Operative History and Physical Exam    Patient ID:  Nicholas James  [id number]  3 y.o.  1940    Today: January 11, 2023           CC:  left knee pain    HPI:   The patient has  OA left knee. The patient was evaluated and examined during a consultation prior to this office visit. There have been no changes to the patient's orthopedic condition since the initial consultation. The patient has failed previous conservative treatment for this condition including antiinflammatories , and lifestyle modifications. The necessity for joint replacement is present. The patient will be admitted the day of surgery for left knee replacement. Past Medical/Surgical History:  Past Medical History:   Diagnosis Date    HTN (hypertension)     Hyperlipidemia     Urinary incontinence      Past Surgical History:   Procedure Laterality Date    APPENDECTOMY      CARDIAC CATHETERIZATION      no stents    COLONOSCOPY      FOOT SURGERY Bilateral     heel spurs    HYSTERECTOMY (CERVIX STATUS UNKNOWN) Bilateral     KNEE ARTHROPLASTY Right     TONSILLECTOMY          Allergies: Allergies   Allergen Reactions    Lisinopril Cough    Losartan Cough    Rosuvastatin Myalgia    Simvastatin Myalgia        Physical Exam:   General: NAD, Alert, Oriented, Appears their stated age     [de-identified]: NC/AT    Skin: No rashes, lesions or wounds seen      Psych: normal affect      Heart: Regular Rate, Rhythm     Lungs: unlabored respirations, no wheezing    Abdomen: Soft and non-distended     Ortho: Pain with limited ROM of the left knee    Neuro: no focal defects, moving extremities equally    Lymph: no lymphadenopathy     Meds:   Current Outpatient Medications   Medication Sig    triamcinolone (KENALOG) 0.1 % cream Apply topically as needed Apply topically 2 times daily.     aspirin 81 MG EC tablet Take 81 mg by mouth daily    CALCIUM PO Take by mouth    amLODIPine (NORVASC) 5 MG tablet TAKE 1 TABLET BY MOUTH EVERY DAY    ascorbic acid (VITAMIN C) 500 MG tablet Take 500 mg by mouth in the morning. vitamin D (CHOLECALCIFEROL) 25 MCG (1000 UT) TABS tablet 2,000 Units  in the morning. estradiol (ESTRACE) 0.1 MG/GM vaginal cream Place 1 g vaginally    famotidine (PEPCID) 20 MG tablet Take 20 mg by mouth nightly as needed    hydrOXYzine HCl (ATARAX) 25 MG tablet TAKE 1 TABLET (25 MG) BY MOUTH DAILY AS NEEDED FOR ITCHING    omeprazole (PRILOSEC) 20 MG delayed release capsule Take 20 mg by mouth in the morning. (Patient not taking: Reported on 12/20/2022)    simvastatin (ZOCOR) 20 MG tablet Take 20 mg by mouth (Patient not taking: Reported on 12/20/2022)     No current facility-administered medications for this visit. Labs:  Hospital Outpatient Visit on 12/20/2022   Component Date Value Ref Range Status    Special Requests 12/20/2022 NO SPECIAL REQUESTS    Final    Culture 12/20/2022 SA target not detected. A MRSA NEGATIVE, SA NEGATIVE test result does not preclude MRSA or SA nasal colonization.     Final    Protime 12/20/2022 12.4 (A)  12.6 - 14.3 sec Final    INR 12/20/2022 0.9    Final    Comment: Suggested therapeutic INR range:  Venous thrombosis and embolus  2.0-3.0  Prosthetic heart valve         2.5-3.5  ** Note new reference range and method **      Hemoglobin A1C 12/20/2022 5.4  4.8 - 5.6 % Final    eAG 12/20/2022 108  mg/dL Final    Comment: Reference Range  Normal: 4.8-5.6  Diabetic >=6.5%  Normal       <5.7%      WBC 12/20/2022 7.3  4.3 - 11.1 K/uL Final    RBC 12/20/2022 4.44  4.05 - 5.2 M/uL Final    Hemoglobin 12/20/2022 13.6  11.7 - 15.4 g/dL Final    Hematocrit 12/20/2022 42.2  35.8 - 46.3 % Final    MCV 12/20/2022 95.0  82.0 - 102.0 FL Final    MCH 12/20/2022 30.6  26.1 - 32.9 PG Final    MCHC 12/20/2022 32.2  31.4 - 35.0 g/dL Final    RDW 12/20/2022 13.2  11.9 - 14.6 % Final    Platelets 59/82/3267 278  150 - 450 K/uL Final    MPV 12/20/2022 11.2  9.4 - 12.3 FL Final nRBC 12/20/2022 0.00  0.0 - 0.2 K/uL Final    **Note: Absolute NRBC parameter is now reported with Hemogram**    Differential Type 12/20/2022 AUTOMATED    Final    Seg Neutrophils 12/20/2022 73  43 - 78 % Final    Lymphocytes 12/20/2022 19  13 - 44 % Final    Monocytes 12/20/2022 7  4.0 - 12.0 % Final    Eosinophils % 12/20/2022 1  0.5 - 7.8 % Final    Basophils 12/20/2022 0  0.0 - 2.0 % Final    Immature Granulocytes 12/20/2022 0  0.0 - 5.0 % Final    Segs Absolute 12/20/2022 5.4  1.7 - 8.2 K/UL Final    Absolute Lymph # 12/20/2022 1.4  0.5 - 4.6 K/UL Final    Absolute Mono # 12/20/2022 0.5  0.1 - 1.3 K/UL Final    Absolute Eos # 12/20/2022 0.1  0.0 - 0.8 K/UL Final    Basophils Absolute 12/20/2022 0.0  0.0 - 0.2 K/UL Final    Absolute Immature Granulocyte 12/20/2022 0.0  0.0 - 0.5 K/UL Final    Sodium 12/20/2022 138  133 - 143 mmol/L Final    Potassium 12/20/2022 3.7  3.5 - 5.1 mmol/L Final    Chloride 12/20/2022 105  101 - 110 mmol/L Final    CO2 12/20/2022 28  21 - 32 mmol/L Final    Anion Gap 12/20/2022 5  2 - 11 mmol/L Final    Glucose 12/20/2022 101 (A)  65 - 100 mg/dL Final    BUN 12/20/2022 7 (A)  8 - 23 MG/DL Final    Creatinine 12/20/2022 0.61  0.6 - 1.0 MG/DL Final    Est, Glom Filt Rate 12/20/2022 >60  >60 ml/min/1.73m2 Final    Comment:      Pediatric calculator link: Janusz.at. org/professionals/kdoqi/gfr_calculatorped       These results are not intended for use in patients <25years of age. eGFR results are calculated without a race factor using  the 2021 CKD-EPI equation. Careful clinical correlation is recommended, particularly when comparing to results calculated using previous equations. The CKD-EPI equation is less accurate in patients with extremes of muscle mass, extra-renal metabolism of creatinine, excessive creatine ingestion, or following therapy that affects renal tubular secretion.       Calcium 12/20/2022 10.1  8.3 - 10.4 MG/DL Final    PTT 12/20/2022 37.6 (A)  24.5 - 34.2 SEC Final    Comment: Heparin Therapeutic Range = 74 - 123 seconds  In addition to factor deficiency, monitoring heparin therapy, etc., evaluation of a prolonged aPTT result should include consideration of preanalytic variables such as heparin flush contamination, specimen integrity issues, etc.      Ventricular Rate 12/20/2022 74  BPM Final    Atrial Rate 12/20/2022 74  BPM Final    P-R Interval 12/20/2022 194  ms Final    QRS Duration 12/20/2022 70  ms Final    Q-T Interval 12/20/2022 404  ms Final    QTc Calculation (Bazett) 12/20/2022 448  ms Final    P Axis 12/20/2022 64  degrees Final    R Axis 12/20/2022 -42  degrees Final    T Axis 12/20/2022 35  degrees Final    Diagnosis 12/20/2022 Normal sinus rhythm   Final                 Patient Active Problem List   Diagnosis    Chest pain of uncertain etiology    S/P total knee replacement using cement    Hiatal hernia    Hyperlipemia    GERD (gastroesophageal reflux disease)    Osteoarthritis of right knee    Age-related osteoporosis without current pathological fracture    Essential hypertension    Urge incontinence of urine    Vitamin D deficiency    Primary osteoarthritis of left knee         Assessment:   1. OA left knee      Plan:    1. Proceed with scheduled left knee replacement         All material risks, benefits, and reasonable alternatives including postponing the procedure were discussed. The patient does wish to proceed with the procedure at this time.

## 2023-01-20 ENCOUNTER — ANESTHESIA EVENT (OUTPATIENT)
Dept: SURGERY | Age: 83
End: 2023-01-20
Payer: MEDICARE

## 2023-01-20 ENCOUNTER — HOSPITAL ENCOUNTER (OUTPATIENT)
Age: 83
Discharge: HOME HEALTH | End: 2023-01-21
Attending: ORTHOPAEDIC SURGERY | Admitting: ORTHOPAEDIC SURGERY
Payer: MEDICARE

## 2023-01-20 ENCOUNTER — HOME HEALTH ADMISSION (OUTPATIENT)
Dept: HOME HEALTH SERVICES | Facility: HOME HEALTH | Age: 83
End: 2023-01-20
Payer: MEDICARE

## 2023-01-20 ENCOUNTER — ANESTHESIA (OUTPATIENT)
Dept: SURGERY | Age: 83
End: 2023-01-20
Payer: MEDICARE

## 2023-01-20 DIAGNOSIS — M17.12 PRIMARY OSTEOARTHRITIS OF LEFT KNEE: ICD-10-CM

## 2023-01-20 DIAGNOSIS — Z96.652 STATUS POST LEFT KNEE REPLACEMENT: Primary | ICD-10-CM

## 2023-01-20 PROCEDURE — 6370000000 HC RX 637 (ALT 250 FOR IP): Performed by: ANESTHESIOLOGY

## 2023-01-20 PROCEDURE — 2580000003 HC RX 258: Performed by: PHYSICIAN ASSISTANT

## 2023-01-20 PROCEDURE — 3600000015 HC SURGERY LEVEL 5 ADDTL 15MIN: Performed by: ORTHOPAEDIC SURGERY

## 2023-01-20 PROCEDURE — 2709999900 HC NON-CHARGEABLE SUPPLY: Performed by: ORTHOPAEDIC SURGERY

## 2023-01-20 PROCEDURE — 97535 SELF CARE MNGMENT TRAINING: CPT

## 2023-01-20 PROCEDURE — 94761 N-INVAS EAR/PLS OXIMETRY MLT: CPT

## 2023-01-20 PROCEDURE — 6360000002 HC RX W HCPCS: Performed by: ANESTHESIOLOGY

## 2023-01-20 PROCEDURE — 3600000005 HC SURGERY LEVEL 5 BASE: Performed by: ORTHOPAEDIC SURGERY

## 2023-01-20 PROCEDURE — 2700000000 HC OXYGEN THERAPY PER DAY

## 2023-01-20 PROCEDURE — 6360000002 HC RX W HCPCS: Performed by: NURSE ANESTHETIST, CERTIFIED REGISTERED

## 2023-01-20 PROCEDURE — 94760 N-INVAS EAR/PLS OXIMETRY 1: CPT

## 2023-01-20 PROCEDURE — 6360000002 HC RX W HCPCS: Performed by: PHYSICIAN ASSISTANT

## 2023-01-20 PROCEDURE — 3700000000 HC ANESTHESIA ATTENDED CARE: Performed by: ORTHOPAEDIC SURGERY

## 2023-01-20 PROCEDURE — 2500000003 HC RX 250 WO HCPCS: Performed by: ANESTHESIOLOGY

## 2023-01-20 PROCEDURE — 2500000003 HC RX 250 WO HCPCS: Performed by: NURSE ANESTHETIST, CERTIFIED REGISTERED

## 2023-01-20 PROCEDURE — 64447 NJX AA&/STRD FEMORAL NRV IMG: CPT | Performed by: ANESTHESIOLOGY

## 2023-01-20 PROCEDURE — 2580000003 HC RX 258: Performed by: ANESTHESIOLOGY

## 2023-01-20 PROCEDURE — 97530 THERAPEUTIC ACTIVITIES: CPT

## 2023-01-20 PROCEDURE — 6370000000 HC RX 637 (ALT 250 FOR IP): Performed by: ORTHOPAEDIC SURGERY

## 2023-01-20 PROCEDURE — 7100000001 HC PACU RECOVERY - ADDTL 15 MIN: Performed by: ORTHOPAEDIC SURGERY

## 2023-01-20 PROCEDURE — 6360000002 HC RX W HCPCS

## 2023-01-20 PROCEDURE — 6360000002 HC RX W HCPCS: Performed by: ORTHOPAEDIC SURGERY

## 2023-01-20 PROCEDURE — 2580000003 HC RX 258: Performed by: ORTHOPAEDIC SURGERY

## 2023-01-20 PROCEDURE — C1776 JOINT DEVICE (IMPLANTABLE): HCPCS | Performed by: ORTHOPAEDIC SURGERY

## 2023-01-20 PROCEDURE — 7100000000 HC PACU RECOVERY - FIRST 15 MIN: Performed by: ORTHOPAEDIC SURGERY

## 2023-01-20 PROCEDURE — 3700000001 HC ADD 15 MINUTES (ANESTHESIA): Performed by: ORTHOPAEDIC SURGERY

## 2023-01-20 PROCEDURE — 2720000010 HC SURG SUPPLY STERILE: Performed by: ORTHOPAEDIC SURGERY

## 2023-01-20 PROCEDURE — 97165 OT EVAL LOW COMPLEX 30 MIN: CPT

## 2023-01-20 PROCEDURE — 2580000003 HC RX 258: Performed by: NURSE ANESTHETIST, CERTIFIED REGISTERED

## 2023-01-20 PROCEDURE — 2500000003 HC RX 250 WO HCPCS: Performed by: ORTHOPAEDIC SURGERY

## 2023-01-20 PROCEDURE — C1713 ANCHOR/SCREW BN/BN,TIS/BN: HCPCS | Performed by: ORTHOPAEDIC SURGERY

## 2023-01-20 PROCEDURE — 6370000000 HC RX 637 (ALT 250 FOR IP): Performed by: PHYSICIAN ASSISTANT

## 2023-01-20 PROCEDURE — 97161 PT EVAL LOW COMPLEX 20 MIN: CPT

## 2023-01-20 DEVICE — TIBIAL COMPONENT
Type: IMPLANTABLE DEVICE | Site: KNEE | Status: FUNCTIONAL
Brand: TRIATHLON

## 2023-01-20 DEVICE — COMPONENT TOT KNEE CAPPED PRIMARY K2STRYKER] STRYKER CORP]: Type: IMPLANTABLE DEVICE | Status: FUNCTIONAL

## 2023-01-20 DEVICE — PATELLA
Type: IMPLANTABLE DEVICE | Site: PATELLA | Status: FUNCTIONAL
Brand: TRIATHLON

## 2023-01-20 DEVICE — CRUCIATE RETAINING FEMORAL
Type: IMPLANTABLE DEVICE | Site: KNEE | Status: FUNCTIONAL
Brand: TRIATHLON

## 2023-01-20 DEVICE — DEPUY CMW 2 FAST SET BONE CEMENT 20G: Type: IMPLANTABLE DEVICE | Site: PATELLA | Status: FUNCTIONAL

## 2023-01-20 DEVICE — INSERT TIB CS 4 10 MM ARTC POST KNEE BEAR TECHNOLOGY X3: Type: IMPLANTABLE DEVICE | Site: KNEE | Status: FUNCTIONAL

## 2023-01-20 RX ORDER — PROPOFOL 10 MG/ML
INJECTION, EMULSION INTRAVENOUS PRN
Status: DISCONTINUED | OUTPATIENT
Start: 2023-01-20 | End: 2023-01-20 | Stop reason: SDUPTHER

## 2023-01-20 RX ORDER — SODIUM CHLORIDE, SODIUM LACTATE, POTASSIUM CHLORIDE, CALCIUM CHLORIDE 600; 310; 30; 20 MG/100ML; MG/100ML; MG/100ML; MG/100ML
INJECTION, SOLUTION INTRAVENOUS CONTINUOUS
Status: DISCONTINUED | OUTPATIENT
Start: 2023-01-20 | End: 2023-01-20 | Stop reason: HOSPADM

## 2023-01-20 RX ORDER — HYDROMORPHONE HYDROCHLORIDE 1 MG/ML
1 INJECTION, SOLUTION INTRAMUSCULAR; INTRAVENOUS; SUBCUTANEOUS
Status: DISCONTINUED | OUTPATIENT
Start: 2023-01-20 | End: 2023-01-21 | Stop reason: HOSPADM

## 2023-01-20 RX ORDER — SODIUM CHLORIDE 9 MG/ML
INJECTION, SOLUTION INTRAVENOUS PRN
Status: DISCONTINUED | OUTPATIENT
Start: 2023-01-20 | End: 2023-01-20 | Stop reason: HOSPADM

## 2023-01-20 RX ORDER — SODIUM CHLORIDE 9 MG/ML
INJECTION, SOLUTION INTRAVENOUS PRN
Status: DISCONTINUED | OUTPATIENT
Start: 2023-01-20 | End: 2023-01-21 | Stop reason: HOSPADM

## 2023-01-20 RX ORDER — HALOPERIDOL 5 MG/ML
1 INJECTION INTRAMUSCULAR
Status: DISCONTINUED | OUTPATIENT
Start: 2023-01-20 | End: 2023-01-20 | Stop reason: HOSPADM

## 2023-01-20 RX ORDER — ROPIVACAINE HYDROCHLORIDE 2 MG/ML
INJECTION, SOLUTION EPIDURAL; INFILTRATION; PERINEURAL PRN
Status: DISCONTINUED | OUTPATIENT
Start: 2023-01-20 | End: 2023-01-20 | Stop reason: HOSPADM

## 2023-01-20 RX ORDER — IPRATROPIUM BROMIDE AND ALBUTEROL SULFATE 2.5; .5 MG/3ML; MG/3ML
1 SOLUTION RESPIRATORY (INHALATION)
Status: DISCONTINUED | OUTPATIENT
Start: 2023-01-20 | End: 2023-01-20 | Stop reason: HOSPADM

## 2023-01-20 RX ORDER — METHOCARBAMOL 750 MG/1
750 TABLET, FILM COATED ORAL 3 TIMES DAILY PRN
Qty: 40 TABLET | Refills: 1 | Status: SHIPPED | OUTPATIENT
Start: 2023-01-20 | End: 2023-01-20 | Stop reason: SDUPTHER

## 2023-01-20 RX ORDER — ACETAMINOPHEN 500 MG
1000 TABLET ORAL EVERY 6 HOURS
Status: DISCONTINUED | OUTPATIENT
Start: 2023-01-20 | End: 2023-01-21 | Stop reason: HOSPADM

## 2023-01-20 RX ORDER — HYDROXYZINE HYDROCHLORIDE 25 MG/1
25 TABLET, FILM COATED ORAL 3 TIMES DAILY PRN
Status: DISCONTINUED | OUTPATIENT
Start: 2023-01-20 | End: 2023-01-21 | Stop reason: HOSPADM

## 2023-01-20 RX ORDER — ASPIRIN 81 MG/1
81 TABLET ORAL 2 TIMES DAILY
Status: DISCONTINUED | OUTPATIENT
Start: 2023-01-20 | End: 2023-01-21 | Stop reason: HOSPADM

## 2023-01-20 RX ORDER — ONDANSETRON 4 MG/1
4 TABLET, ORALLY DISINTEGRATING ORAL EVERY 8 HOURS PRN
Status: DISCONTINUED | OUTPATIENT
Start: 2023-01-20 | End: 2023-01-21 | Stop reason: HOSPADM

## 2023-01-20 RX ORDER — SODIUM CHLORIDE 0.9 % (FLUSH) 0.9 %
5-40 SYRINGE (ML) INJECTION EVERY 12 HOURS SCHEDULED
Status: DISCONTINUED | OUTPATIENT
Start: 2023-01-20 | End: 2023-01-20 | Stop reason: HOSPADM

## 2023-01-20 RX ORDER — ACETAMINOPHEN 500 MG
1000 TABLET ORAL ONCE
Status: DISCONTINUED | OUTPATIENT
Start: 2023-01-20 | End: 2023-01-20 | Stop reason: HOSPADM

## 2023-01-20 RX ORDER — DIPHENHYDRAMINE HYDROCHLORIDE 50 MG/ML
25 INJECTION INTRAMUSCULAR; INTRAVENOUS EVERY 6 HOURS PRN
Status: DISCONTINUED | OUTPATIENT
Start: 2023-01-20 | End: 2023-01-21 | Stop reason: HOSPADM

## 2023-01-20 RX ORDER — CELECOXIB 200 MG/1
200 CAPSULE ORAL DAILY PRN
Qty: 60 CAPSULE | Refills: 2 | Status: SHIPPED | OUTPATIENT
Start: 2023-01-20 | End: 2023-01-20 | Stop reason: SDUPTHER

## 2023-01-20 RX ORDER — KETAMINE HCL IN NACL, ISO-OSM 20 MG/2 ML
10 SYRINGE (ML) INJECTION EVERY 10 MIN PRN
Status: DISCONTINUED | OUTPATIENT
Start: 2023-01-20 | End: 2023-01-21 | Stop reason: HOSPADM

## 2023-01-20 RX ORDER — AMLODIPINE BESYLATE 5 MG/1
5 TABLET ORAL DAILY
Status: DISCONTINUED | OUTPATIENT
Start: 2023-01-21 | End: 2023-01-21 | Stop reason: HOSPADM

## 2023-01-20 RX ORDER — ASPIRIN 81 MG/1
81 TABLET ORAL 2 TIMES DAILY
Status: DISCONTINUED | OUTPATIENT
Start: 2023-01-20 | End: 2023-01-20

## 2023-01-20 RX ORDER — LIDOCAINE HYDROCHLORIDE 10 MG/ML
1 INJECTION, SOLUTION INFILTRATION; PERINEURAL
Status: COMPLETED | OUTPATIENT
Start: 2023-01-20 | End: 2023-01-20

## 2023-01-20 RX ORDER — FENTANYL CITRATE 50 UG/ML
INJECTION, SOLUTION INTRAMUSCULAR; INTRAVENOUS PRN
Status: DISCONTINUED | OUTPATIENT
Start: 2023-01-20 | End: 2023-01-20 | Stop reason: SDUPTHER

## 2023-01-20 RX ORDER — MIDAZOLAM HYDROCHLORIDE 2 MG/2ML
2 INJECTION, SOLUTION INTRAMUSCULAR; INTRAVENOUS
Status: COMPLETED | OUTPATIENT
Start: 2023-01-20 | End: 2023-01-20

## 2023-01-20 RX ORDER — SODIUM CHLORIDE 0.9 % (FLUSH) 0.9 %
5-40 SYRINGE (ML) INJECTION EVERY 12 HOURS SCHEDULED
Status: DISCONTINUED | OUTPATIENT
Start: 2023-01-20 | End: 2023-01-21 | Stop reason: HOSPADM

## 2023-01-20 RX ORDER — ACETAMINOPHEN 500 MG
1000 TABLET ORAL ONCE
Status: COMPLETED | OUTPATIENT
Start: 2023-01-20 | End: 2023-01-20

## 2023-01-20 RX ORDER — ASPIRIN 81 MG/1
81 TABLET ORAL EVERY 12 HOURS
Qty: 70 TABLET | Refills: 0 | Status: SHIPPED | OUTPATIENT
Start: 2023-01-20 | End: 2023-01-20 | Stop reason: SDUPTHER

## 2023-01-20 RX ORDER — PROCHLORPERAZINE EDISYLATE 5 MG/ML
5 INJECTION INTRAMUSCULAR; INTRAVENOUS
Status: DISCONTINUED | OUTPATIENT
Start: 2023-01-20 | End: 2023-01-20 | Stop reason: HOSPADM

## 2023-01-20 RX ORDER — ONDANSETRON 2 MG/ML
4 INJECTION INTRAMUSCULAR; INTRAVENOUS EVERY 6 HOURS PRN
Status: DISCONTINUED | OUTPATIENT
Start: 2023-01-20 | End: 2023-01-21 | Stop reason: HOSPADM

## 2023-01-20 RX ORDER — OXYCODONE HYDROCHLORIDE 5 MG/1
5-10 TABLET ORAL EVERY 4 HOURS PRN
Qty: 60 TABLET | Refills: 0 | Status: SHIPPED | OUTPATIENT
Start: 2023-01-20 | End: 2023-01-21 | Stop reason: HOSPADM

## 2023-01-20 RX ORDER — FAMOTIDINE 20 MG/1
20 TABLET, FILM COATED ORAL NIGHTLY PRN
Status: DISCONTINUED | OUTPATIENT
Start: 2023-01-20 | End: 2023-01-21 | Stop reason: HOSPADM

## 2023-01-20 RX ORDER — CELECOXIB 200 MG/1
200 CAPSULE ORAL DAILY PRN
Qty: 60 CAPSULE | Refills: 2 | Status: SHIPPED | OUTPATIENT
Start: 2023-01-20

## 2023-01-20 RX ORDER — OXYCODONE HYDROCHLORIDE 5 MG/1
5 TABLET ORAL
Status: COMPLETED | OUTPATIENT
Start: 2023-01-20 | End: 2023-01-20

## 2023-01-20 RX ORDER — TRAMADOL HYDROCHLORIDE 50 MG/1
50 TABLET ORAL EVERY 6 HOURS PRN
Status: DISCONTINUED | OUTPATIENT
Start: 2023-01-20 | End: 2023-01-21 | Stop reason: HOSPADM

## 2023-01-20 RX ORDER — HYDROMORPHONE HYDROCHLORIDE 1 MG/ML
0.5 INJECTION, SOLUTION INTRAMUSCULAR; INTRAVENOUS; SUBCUTANEOUS EVERY 5 MIN PRN
Status: DISCONTINUED | OUTPATIENT
Start: 2023-01-20 | End: 2023-01-20 | Stop reason: HOSPADM

## 2023-01-20 RX ORDER — OXYCODONE HYDROCHLORIDE 5 MG/1
10 TABLET ORAL EVERY 4 HOURS PRN
Status: DISCONTINUED | OUTPATIENT
Start: 2023-01-20 | End: 2023-01-21 | Stop reason: HOSPADM

## 2023-01-20 RX ORDER — METHOCARBAMOL 750 MG/1
750 TABLET, FILM COATED ORAL 3 TIMES DAILY PRN
Qty: 40 TABLET | Refills: 1 | Status: SHIPPED | OUTPATIENT
Start: 2023-01-20

## 2023-01-20 RX ORDER — SODIUM CHLORIDE 0.9 % (FLUSH) 0.9 %
5-40 SYRINGE (ML) INJECTION PRN
Status: DISCONTINUED | OUTPATIENT
Start: 2023-01-20 | End: 2023-01-20 | Stop reason: HOSPADM

## 2023-01-20 RX ORDER — PROMETHAZINE HYDROCHLORIDE 25 MG/1
25 TABLET ORAL EVERY 8 HOURS PRN
Qty: 30 TABLET | Refills: 1 | Status: SHIPPED | OUTPATIENT
Start: 2023-01-20

## 2023-01-20 RX ORDER — ONDANSETRON 2 MG/ML
INJECTION INTRAMUSCULAR; INTRAVENOUS PRN
Status: DISCONTINUED | OUTPATIENT
Start: 2023-01-20 | End: 2023-01-20 | Stop reason: SDUPTHER

## 2023-01-20 RX ORDER — OXYCODONE HYDROCHLORIDE 5 MG/1
5-10 TABLET ORAL EVERY 4 HOURS PRN
Qty: 60 TABLET | Refills: 0 | Status: SHIPPED | OUTPATIENT
Start: 2023-01-20 | End: 2023-01-20 | Stop reason: SDUPTHER

## 2023-01-20 RX ORDER — DIPHENHYDRAMINE HCL 25 MG
25 CAPSULE ORAL EVERY 6 HOURS PRN
Status: DISCONTINUED | OUTPATIENT
Start: 2023-01-20 | End: 2023-01-21 | Stop reason: HOSPADM

## 2023-01-20 RX ORDER — SENNA AND DOCUSATE SODIUM 50; 8.6 MG/1; MG/1
1 TABLET, FILM COATED ORAL 2 TIMES DAILY
Status: DISCONTINUED | OUTPATIENT
Start: 2023-01-20 | End: 2023-01-21 | Stop reason: HOSPADM

## 2023-01-20 RX ORDER — KETOROLAC TROMETHAMINE 30 MG/ML
INJECTION, SOLUTION INTRAMUSCULAR; INTRAVENOUS PRN
Status: DISCONTINUED | OUTPATIENT
Start: 2023-01-20 | End: 2023-01-20 | Stop reason: HOSPADM

## 2023-01-20 RX ORDER — ASPIRIN 81 MG/1
81 TABLET ORAL EVERY 12 HOURS
Qty: 70 TABLET | Refills: 0 | Status: SHIPPED | OUTPATIENT
Start: 2023-01-20 | End: 2023-02-24

## 2023-01-20 RX ORDER — FENTANYL CITRATE 50 UG/ML
100 INJECTION, SOLUTION INTRAMUSCULAR; INTRAVENOUS
Status: COMPLETED | OUTPATIENT
Start: 2023-01-20 | End: 2023-01-20

## 2023-01-20 RX ORDER — TRANEXAMIC ACID 100 MG/ML
INJECTION, SOLUTION INTRAVENOUS PRN
Status: DISCONTINUED | OUTPATIENT
Start: 2023-01-20 | End: 2023-01-20 | Stop reason: SDUPTHER

## 2023-01-20 RX ORDER — PROMETHAZINE HYDROCHLORIDE 25 MG/1
25 TABLET ORAL EVERY 8 HOURS PRN
Qty: 30 TABLET | Refills: 1 | Status: SHIPPED | OUTPATIENT
Start: 2023-01-20 | End: 2023-01-20 | Stop reason: SDUPTHER

## 2023-01-20 RX ORDER — SODIUM CHLORIDE 0.9 % (FLUSH) 0.9 %
5-40 SYRINGE (ML) INJECTION PRN
Status: DISCONTINUED | OUTPATIENT
Start: 2023-01-20 | End: 2023-01-21 | Stop reason: HOSPADM

## 2023-01-20 RX ADMIN — MIDAZOLAM 1 MG: 1 INJECTION INTRAMUSCULAR; INTRAVENOUS at 07:27

## 2023-01-20 RX ADMIN — DEXAMETHASONE SODIUM PHOSPHATE 4 MG: 4 INJECTION, SOLUTION INTRAMUSCULAR; INTRAVENOUS at 07:27

## 2023-01-20 RX ADMIN — ROPIVACAINE HYDROCHLORIDE 20 ML: 2 INJECTION, SOLUTION EPIDURAL; INFILTRATION at 07:27

## 2023-01-20 RX ADMIN — PHENYLEPHRINE HYDROCHLORIDE 100 MCG: 0.1 INJECTION, SOLUTION INTRAVENOUS at 08:57

## 2023-01-20 RX ADMIN — PHENYLEPHRINE HYDROCHLORIDE 50 MCG: 0.1 INJECTION, SOLUTION INTRAVENOUS at 08:38

## 2023-01-20 RX ADMIN — ACETAMINOPHEN 1000 MG: 500 TABLET, FILM COATED ORAL at 18:03

## 2023-01-20 RX ADMIN — HYDROMORPHONE HYDROCHLORIDE 0.5 MG: 1 INJECTION, SOLUTION INTRAMUSCULAR; INTRAVENOUS; SUBCUTANEOUS at 10:40

## 2023-01-20 RX ADMIN — OXYCODONE 10 MG: 5 TABLET ORAL at 15:47

## 2023-01-20 RX ADMIN — TRANEXAMIC ACID 1000 MG: 100 INJECTION, SOLUTION INTRAVENOUS at 08:20

## 2023-01-20 RX ADMIN — ASPIRIN 81 MG: 81 TABLET, COATED ORAL at 20:25

## 2023-01-20 RX ADMIN — HYDROMORPHONE HYDROCHLORIDE 0.5 MG: 1 INJECTION, SOLUTION INTRAMUSCULAR; INTRAVENOUS; SUBCUTANEOUS at 10:45

## 2023-01-20 RX ADMIN — FENTANYL CITRATE 50 MCG: 50 INJECTION, SOLUTION INTRAMUSCULAR; INTRAVENOUS at 10:24

## 2023-01-20 RX ADMIN — PHENYLEPHRINE HYDROCHLORIDE 50 MCG: 0.1 INJECTION, SOLUTION INTRAVENOUS at 08:45

## 2023-01-20 RX ADMIN — ACETAMINOPHEN 1000 MG: 500 TABLET ORAL at 06:23

## 2023-01-20 RX ADMIN — ONDANSETRON 4 MG: 2 INJECTION INTRAMUSCULAR; INTRAVENOUS at 10:10

## 2023-01-20 RX ADMIN — PHENYLEPHRINE HYDROCHLORIDE 100 MCG: 0.1 INJECTION, SOLUTION INTRAVENOUS at 09:06

## 2023-01-20 RX ADMIN — SODIUM CHLORIDE, SODIUM LACTATE, POTASSIUM CHLORIDE, AND CALCIUM CHLORIDE: 600; 310; 30; 20 INJECTION, SOLUTION INTRAVENOUS at 06:34

## 2023-01-20 RX ADMIN — PROPOFOL 50 MCG/KG/MIN: 10 INJECTION, EMULSION INTRAVENOUS at 08:22

## 2023-01-20 RX ADMIN — LIDOCAINE HYDROCHLORIDE 1 ML: 10 INJECTION, SOLUTION INFILTRATION; PERINEURAL at 06:34

## 2023-01-20 RX ADMIN — CEFAZOLIN 2000 MG: 10 INJECTION, POWDER, FOR SOLUTION INTRAVENOUS at 15:47

## 2023-01-20 RX ADMIN — PHENYLEPHRINE HYDROCHLORIDE 20 MCG/MIN: 10 INJECTION INTRAVENOUS at 09:16

## 2023-01-20 RX ADMIN — FENTANYL CITRATE 50 MCG: 50 INJECTION, SOLUTION INTRAMUSCULAR; INTRAVENOUS at 10:30

## 2023-01-20 RX ADMIN — OXYCODONE 5 MG: 5 TABLET ORAL at 11:24

## 2023-01-20 RX ADMIN — PROPOFOL 50 MG: 10 INJECTION, EMULSION INTRAVENOUS at 08:20

## 2023-01-20 RX ADMIN — Medication 2000 MG: at 07:58

## 2023-01-20 RX ADMIN — SODIUM CHLORIDE, PRESERVATIVE FREE 10 ML: 5 INJECTION INTRAVENOUS at 21:30

## 2023-01-20 RX ADMIN — MEPIVACAINE HYDROCHLORIDE 60 MG: 20 INJECTION, SOLUTION EPIDURAL; INFILTRATION at 08:06

## 2023-01-20 RX ADMIN — Medication 10 MG: at 10:53

## 2023-01-20 RX ADMIN — SODIUM CHLORIDE, SODIUM LACTATE, POTASSIUM CHLORIDE, AND CALCIUM CHLORIDE: 600; 310; 30; 20 INJECTION, SOLUTION INTRAVENOUS at 09:15

## 2023-01-20 RX ADMIN — FENTANYL CITRATE 50 MCG: 50 INJECTION, SOLUTION INTRAMUSCULAR; INTRAVENOUS at 07:27

## 2023-01-20 ASSESSMENT — PAIN - FUNCTIONAL ASSESSMENT: PAIN_FUNCTIONAL_ASSESSMENT: 0-10

## 2023-01-20 ASSESSMENT — PAIN SCALES - GENERAL
PAINLEVEL_OUTOF10: 6
PAINLEVEL_OUTOF10: 8
PAINLEVEL_OUTOF10: 3
PAINLEVEL_OUTOF10: 0
PAINLEVEL_OUTOF10: 6

## 2023-01-20 ASSESSMENT — KOOS JR
STRAIGHTENING KNEE FULLY: 2
HOW SEVERE IS YOUR KNEE STIFFNESS AFTER FIRST WAKING IN MORNING: 2
GOING UP OR DOWN STAIRS: 2
BENDING TO THE FLOOR TO PICK UP OBJECT: 2
STANDING UPRIGHT: 2
RISING FROM SITTING: 2
KOOS JR TOTAL INTERVAL SCORE: 52.465
TWISING OR PIVOTING ON KNEE: 2

## 2023-01-20 ASSESSMENT — PAIN DESCRIPTION - DESCRIPTORS: DESCRIPTORS: ACHING

## 2023-01-20 ASSESSMENT — PAIN DESCRIPTION - ORIENTATION
ORIENTATION: LEFT
ORIENTATION: LEFT

## 2023-01-20 ASSESSMENT — PAIN DESCRIPTION - LOCATION
LOCATION: KNEE
LOCATION: KNEE

## 2023-01-20 NOTE — ANESTHESIA PROCEDURE NOTES
Peripheral Block    Patient location during procedure: pre-op  Reason for block: post-op pain management and at surgeon's request  Start time: 1/20/2023 7:27 AM  End time: 1/20/2023 7:29 AM  Staffing  Performed: anesthesiologist   Anesthesiologist: Lexi Jimenes MD  Preanesthetic Checklist  Completed: patient identified, IV checked, site marked, risks and benefits discussed, surgical/procedural consents, equipment checked, pre-op evaluation, timeout performed, anesthesia consent given, oxygen available and monitors applied/VS acknowledged  Peripheral Block   Patient position: supine  Prep: ChloraPrep  Provider prep: mask  Patient monitoring: cardiac monitor, continuous pulse ox, frequent blood pressure checks, IV access and oxygen  Block type: Femoral  Adductor canal  Laterality: left  Injection technique: single-shot  Guidance: ultrasound guided  Local infiltration: ropivacaine  Local infiltration: ropivacaine    Needle   Needle type: insulated echogenic nerve stimulator needle   Needle gauge: 21 G  Needle localization: ultrasound guidance  Needle length: 10 cm  Assessment   Injection assessment: negative aspiration for heme, no paresthesia on injection, local visualized surrounding nerve on ultrasound and no intravascular symptoms  Paresthesia pain: none  Slow fractionated injection: yes  Hemodynamics: stable  Real-time US image taken/store: yes  Outcomes: patient tolerated procedure well and uncomplicated    Additional Notes  -Block placed for post op pain at surgeon's request.     -Ultrasound used to identify anatomy of nerve bundle. -Needle placement and local injection at perineural area confirmed with real time ultrasound guidance.     -Local visualized with ultrasound surrounding nerve. -Permanent Image taken and placed on chart.       Medications Administered  dexamethasone 4 MG/ML - Perineural   4 mg - 1/20/2023 7:27:00 AM  EPINEPHrine PF 1 MG/ML Soln, ropivacaine 0.2% Soln (Mixture components: EPINEPHrine PF 1 MG/ML Soln, 0.005 mL; ropivacaine 0.2% Soln, 0.1 mL) - Perineural   20 mL - 1/20/2023 7:27:00 AM

## 2023-01-20 NOTE — PERIOP NOTE
TRANSFER - OUT REPORT:    Verbal report given to BAY Ray on Barbie Donkrunal  being transferred to [facility] for routine post-op       Report consisted of patient's Situation, Background, Assessment and   Recommendations(SBAR). Information from the following report(s) Nurse Handoff Report, Index, Adult Overview, Surgery Report, Intake/Output, and MAR was reviewed with the receiving nurse. [place] Assessment: No data recorded  Lines:   Peripheral IV 01/20/23 Left Hand (Active)   Site Assessment Clean, dry & intact 01/20/23 1032   Line Status Infusing 01/20/23 1032   Phlebitis Assessment No symptoms 01/20/23 1032   Infiltration Assessment 0 01/20/23 1032   Alcohol Cap Used No 01/20/23 1032   Dressing Status Clean, dry & intact 01/20/23 1032   Dressing Type Transparent 01/20/23 1032        Opportunity for questions and clarification was provided.       Patient transported with:  O2 @ 2lpm

## 2023-01-20 NOTE — OP NOTE
[address] [place] [address] Robotic Assisted Total Knee Arthroplasty: Posterior Cruciate Retaining       Patient:Mindy Page   : 1940  Medical Record Number:[id number]  Pre-operative Diagnosis:  Primary osteoarthritis of left knee [M17.12]  Post-operative Diagnosis: Primary osteoarthritis of left knee [M17.12]  Location: [facility]  Surgeon: Andre Bowser MD   Assistant: Nelson Rincon    Anesthesia: Spinal and ACB    Indications: Patient has end stage arthritis. They have tried and failed conservative management. Procedure:Procedure(s) (LRB):  lt KNEE TOTAL ARTHROPLASTY ROBOTIC (Left)            CPT- 34073- Total knee arthroplasty           39728- Other procedures on musculoskeletal system            0055T- Computer assisted surgical navigation   The complexity of the total joint surgery requires the use of a first assistant for positioning, retraction and expertise in closure. Tourniquet Time: 0 minutes  EBL: 250 cc  Findings: severe degenerative arthritis with loss of cartilage in weight bearing compartments of the knee,  patellar osteophytes with lossof patella cartilage, posterior femoral osteophytes   BMI: Body mass index is 27.54 kg/m². John Dailey was brought to the operating room and positioned on the operating table. She was anesthestized with anesthesia. IV antibiotics were administered. Prior to the incision being made a timeout was called identifying the patient, procedure ,operative side and surgeon The operative leg was prepped and draped in the usual sterile manner. An anterior longitudinal incision was accomplished just medial to the tibial tubercle and extending approximal 6 centimeters proximal to the superior pole of the patella. A medial parapatellar capsular incision was performed. The medial capsular flap was elevated around to the insertion of the semimembranous tendon.   The patella was everted and the knee flexed and externally rotated. The medial and external menisci were excised. The lateral half of the fat pad excised and the patella femoral ligament was released. The anterior cruciate ligament was resect and the posterior cruciate ligament was retained. The femoral and tibial arrays were pinned in place and registered with the [employer]. The patient landmarks were collected and the tibial and femoral checkpoints were registered and verified. The preresection balancing was performed. The distal femur was addressed first. Utilizing the [facility] robotic arm the distal femoral cut was made. The anterior and posterior cuts were then made. The osteophytes were removed from the tibial and femoral surfaces. The tibia was then addressed. The [facility] robotic arm was then used to make the measured resection of the tibia. The tibia was sized. The tibial base plate was pinned into place with the appropriate external rotation and stem site prepared. A trial femoral component and poly was placed. A preliminary range of motion was accomplished with the trial components. The patient was found to obtain full extension as well as appropriate flexion. The patient's ligaments were stable in flexion and extension to medial and lateral stressing and the alignment was through the appropriate mechanical axis. Additional surgical procedures included: none. The patella was then everted. The bone was resect to accommodate the three peg patellar button. A trial reduction of the patella revealed appropriate tracking through the patellofemoral groove with no lateral retinacular release being accomplished. All trial components were removed. The real implants were opened: Sizes listed below. The knee was irrigated. There were no femoral deficiencies. There were no tibial deficiencies. No augmentation was utilized. The tibial and femoral components were impacted into place.  The patella component was cemented into place. Peace Ram knee was placed through range of motion and noted to be stable as mentioned above with the trail components. The wound was dry, therefore no drain was used. The operative knee was injected with 60 cc of Naropin, 10 cc's of morphine and 1 cc of 30 mg of Toradol. The knee was then soaked with a diluted betadine solution for approximately 3 min. This was then thoroughly irrigated. The capsular layer was closed using a #1 Stratafix suture. Then, 1 gram (100 mg/ml) of Transexamic Acid was injected into the joint space. The subcutaneous layers were closed using 2-0 Stratafix. Finally the skin was closed using 3-0 Vicryl and staples which were applied in occlusive fashion and sterile bandage applied. An Iceman cryo pad was applied on the operative leg. Sponge count and needle counts were correct. Peace Ram left the operating room     Implants:   Implant Name Type Inv.  Item Serial No.  Lot No. LRB No. Used Action   CEMENT BNE 20GM HALF DOSE PMMA VISC RADPQ FAST - [id number]  CEMENT BNE 20GM HALF DOSE PMMA VISC RADPQ FAST  [employer]- [id number] Left 1 Implanted   COMPONENT FEM SZ 3 L KNEE CRUCE RET CEMENTLESS BEAD W/ JADE - [id number]  COMPONENT FEM SZ 3 L KNEE CRUCE RET CEMENTLESS BEAD W/ JADE  [employer] [facility] [id number] Left 1 Implanted   COMPONENT PAT [id number] SUP INFERIOR ASYM TRIATHLON - [id number]  COMPONENT PAT [id number] SUP INFERIOR ASYM TRIATHLON  [employer] [facility] [id number] Left 1 Implanted   BASEPLATE TIB SZ 4 [id number] ML70MM KNEE TRITANIUM 4 CRUCFRM - [id number]  BASEPLATE TIB SZ 4 [id number] ML70MM KNEE TRITANIUM 4 CRUCFRM  [employer] [facility] [id number] Left 1 Implanted   INSERT TIB CS 4 10 MM ARTC POST KNEE BEAR TECHNOLOGY X3 - [id number]  INSERT TIB CS 4 10 MM ARTC POST KNEE BEAR TECHNOLOGY X3  [employer] [facility] [id number] Left 1 Implanted         Signed By: Srikanth Keating MD 1/20/2023,  9:40 AM

## 2023-01-20 NOTE — INTERVAL H&P NOTE
Update History & Physical    The patient's History and Physical of January 11, H&P was reviewed with the patient and I examined the patient. There was no change. The surgical site was confirmed by the patient and me. Plan: The risks, benefits, expected outcome, and alternative to the recommended procedure have been discussed with the patient. Patient understands and wants to proceed with the procedure.      Electronically signed by Ira Barnard MD on 1/20/2023 at 6:38 AM

## 2023-01-20 NOTE — PROGRESS NOTES
OCCUPATIONAL THERAPY Initial Assessment, Daily Note, and PM      (Link to Caseload Tracking:    OT Orders   Time  OT Charge Capture  Rehab Caseload Tracker  Episode     Yulissa Castle is a 80 y.o. female   PRIMARY DIAGNOSIS: Status post left knee replacement  Primary osteoarthritis of left knee [M17.12]  Procedure(s) (LRB):  lt KNEE TOTAL ARTHROPLASTY ROBOTIC (Left)  Day of Surgery  Reason for Referral: Pain in Left Knee (M25.562)  Stiffness of Left Knee, Not elsewhere classified (V44.490)  Outpatient in a bed: Payor: MEDICARE / Plan: MEDICARE PART A AND B / Product Type: *No Product type* /     ASSESSMENT:     REHAB RECOMMENDATIONS:   Recommendation to date pending progress:  Setting:  Home Health Therapy    Equipment:    3 in 1 Bedside Commode  Rolling Walker     ASSESSMENT:  Ms. Jj Santos is s/p left TKA and presents with decreased independence with functional mobility and activities of daily living as compared to baseline level of function and safety. Patient would benefit from skilled Occupational Therapy to maximize independence and safety with self-care task and functional mobility. Patient seen in room with 2 daughters present. Pt up to edge of bed and donned clothes. Mobilized from hospital bed to hallway using a rolling walker with assist. Patient is hopeful to spend the night to better prepare for safe discharge home. OT will continue with self care training tomorrow.  Pt should do well     [address] [facility] [address] [address] AM-PAC 6 Clicks Daily Activity Inpatient Short Form:    AM-PAC Daily Activity Inpatient   How much help for putting on and taking off regular lower body clothing?: A Little  How much help for Bathing?: A Little  How much help for Toileting?: A Little  How much help for putting on and taking off regular upper body clothing?: None  How much help for taking care of personal grooming?: None  How much help for eating meals?: None  AM-PAC Inpatient Daily Activity Raw Score: 21  AM-PAC Inpatient ADL T-Scale Score : 44.27  ADL Inpatient CMS 0-100% Score: 32.79  ADL Inpatient CMS G-Code Modifier : CJ     SUBJECTIVE:     Ms. Kori Duggan states, she feels better       Social/Functional   Lives With: Spouse, Family  Type of Home: House  Home Layout: One level  Home Access: Ramped entrance  Ambulation Assistance: Independent (used a cane outside for last 6 months)  Transfer Assistance: Independent    OBJECTIVE:     Genia Zaira / Burt Flurry / Clementeen Seats: None    RESTRICTIONS/PRECAUTIONS:  Restrictions/Precautions: Weight Bearing, Fall Risk  Left Lower Extremity Weight Bearing: Weight Bearing As Tolerated    PAIN: VITALS / O2:   Pre Treatment:          Post Treatment: none Vitals          Oxygen        GROSS EVALUATION: INTACT IMPAIRED   (See Comments)   UE AROM [x] []   UE PROM [x] []   Strength [x]       Posture / Balance []  Decreased standing balance.     Sensation [x]     Coordination [x]       Tone [x]       Edema []    Activity Tolerance [x]       Hand Dominance R [] L []      COGNITION/  PERCEPTION: INTACT IMPAIRED   (See Comments)   Orientation [x]     Vision [x]     Hearing [x]     Cognition  [x]     Perception [x]       MOBILITY: I Mod I S SBA CGA Min Mod Max Total  NT x2 Comments:   Bed Mobility    Rolling [] [] [] [] [] [] [] [] [] [] []    Supine to Sit [] [] [] [] [] [] [] [] [] [] []    Scooting [] [] [] [] [] [] [] [] [] [] []    Sit to Supine [] [] [] [] [] [] [] [] [] [] []    Transfers    Sit to Stand [] [] [] [] [x] [] [] [] [] [] []    Bed to Chair [] [] [] [] [x] [] [] [] [] [] []    Stand to Sit [] [] [] [] [x] [] [] [] [] [] []    Tub/Shower [] [] [] [] [] [x] [] [] [] [] []       Toilet [] [] [] [] [] [x] [] [] [] [] []        [] [] [] [] [] [] [] [] [] [] []    I=Independent, Mod I=Modified Independent, S=Supervision/Setup, SBA=Standby Assistance, CGA=Contact Guard Assistance, Min=Minimal Assistance, Mod=Moderate Assistance, Max=Maximal Assistance, Total=Total Assistance, NT=Not Tested    ACTIVITIES OF DAILY LIVING: I Mod I S SBA CGA Min Mod Max Total NT Comments   BASIC ADLs:              Upper Body Bathing [] [] [] [x] [] [] [] [] [] []    Lower Body Bathing [] [] [] [] [] [x] [] [] [] []    Toileting [] [] [] [] [] [x] [] [] [] []    Upper Body Dressing [] [] [] [x] [] [] [] [] [] []    Lower Body Dressing [] [] [] [] [] [x] [] [] [] []    Feeding [x] [] [] [] [] [] [] [] [] []    Grooming [] [] [] [x] [] [] [] [] [] []    Personal Device Care [] [] [] [] [] [] [] [] [] []    Functional Mobility [] [] [] [] [x] [] [] [] [] [] RW   I=Independent, Mod I=Modified Independent, S=Supervision/Setup, SBA=Standby Assistance, CGA=Contact Guard Assistance, Min=Minimal Assistance, Mod=Moderate Assistance, Max=Maximal Assistance, Total=Total Assistance, NT=Not Tested    PLAN:     FREQUENCY/DURATION   OT Plan of Care: 2 times/week for duration of hospital stay or until stated goals are met, whichever comes first.    ACUTE OCCUPATIONAL THERAPY GOALS:   (Developed with and agreed upon by patient and/or caregiver.)    GOALS:   DISCHARGE GOALS (in preparation for going home/rehab):  3 days  1. Ms. Renae Wiggins will perform lower body dressing activity with stand by assist required to demonstrate improved functional mobility and safety. 2.  Ms. Renae Wiggins will perform bathing activity with stand by assist required to demonstrate improved functional mobility and safety. 3.  Ms. Renae Wiggins will perform toileting activity with  stand by assist to demonstrate improved functional mobility and safety. 4.  Ms. Renae Wiggins will perform all functional transfers transfer with stand by assist to demonstrate improved functional mobility and safety.       PROBLEM LIST:   (Skilled intervention is medically necessary to address:)  Decreased ADL/Functional Activities  Decreased Balance  Increased Pain   INTERVENTIONS PLANNED:   (Benefits and precautions of occupational therapy have been discussed with the patient.)  Self Care Training  Education         TREATMENT:     EVALUATION: LOW COMPLEXITY: (Untimed Charge)    TREATMENT:   Self Care: (25 min): Procedure(s) (per grid) utilized to improve and/or restore self-care/home management as related to dressing and functional mobility . Required minimal verbal, manual, and   cueing to facilitate activities of daily living skills and compensatory activities.     AFTER TREATMENT PRECAUTIONS: Bed/Chair Locked, Call light within reach, Chair, Needs within reach, RN notified, and Visitors at bedside    INTERDISCIPLINARY COLLABORATION:  RN/ PCT, PT/ PTA, and OT/ DAVIS    EDUCATION:  Education Given To: Patient, Family  Education Provided: Role of Therapy, Plan of Care, ADL Adaptive Strategies, Transfer Training, IADL Safety, Family Education, Fall Prevention Strategies  Education Outcome: Verbalized understanding, Demonstrated understanding  [] Safe And Effective Hygiene  [x] Fall Precautions  [] Hip Precautions  [] D/C Instruction Review [] Prosthesis Review  [x] Walker Management/Safety  [x] Adaptive Equipment as Needed  [x] Therapeutic Resting Position of Joint       TOTAL TREATMENT DURATION AND TIME:  Time In: 1430  Time Out: 1500  Minutes: 5419 Sherrie Keenan, OT

## 2023-01-20 NOTE — ANESTHESIA PRE PROCEDURE
Department of Anesthesiology  Preprocedure Note       Name:  Peace Ram   Age:  80 y.o.  :  1940                                          MRN:  [id number]         Date:  2023      Surgeon: Shlomo Alves):  Taz Duenas MD    Procedure: Procedure(s):  lt KNEE TOTAL ARTHROPLASTY ROBOTIC    Medications prior to admission:   Prior to Admission medications    Medication Sig Start Date End Date Taking? Authorizing Provider   triamcinolone (KENALOG) 0.1 % cream Apply topically as needed Apply topically 2 times daily. Patient not taking: Reported on 2023    Historical Provider, MD   aspirin 81 MG EC tablet Take 81 mg by mouth daily    Historical Provider, MD   CALCIUM PO Take by mouth    Historical Provider, MD   amLODIPine (NORVASC) 5 MG tablet TAKE 1 TABLET BY MOUTH EVERY DAY 22   Historical Provider, MD   ascorbic acid (VITAMIN C) 500 MG tablet Take 500 mg by mouth in the morning. Historical Provider, MD   vitamin D (CHOLECALCIFEROL) 25 MCG (1000 UT) TABS tablet 2,000 Units  in the morning. Historical Provider, MD   estradiol (ESTRACE) 0.1 MG/GM vaginal cream Place 1 g vaginally 4/13/22 10/10/22  Historical Provider, MD   famotidine (PEPCID) 20 MG tablet Take 20 mg by mouth nightly as needed    Historical Provider, MD   hydrOXYzine HCl (ATARAX) 25 MG tablet TAKE 1 TABLET (25 MG) BY MOUTH DAILY AS NEEDED FOR ITCHING  Patient not taking: Reported on 2023 7/15/22   Historical Provider, MD   omeprazole (PRILOSEC) 20 MG delayed release capsule Take 20 mg by mouth in the morning.   Patient not taking: No sig reported    Historical Provider, MD   simvastatin (ZOCOR) 20 MG tablet Take 20 mg by mouth  Patient not taking: No sig reported    Historical Provider, MD       Current medications:    Current Facility-Administered Medications   Medication Dose Route Frequency Provider Last Rate Last Admin    fentaNYL (SUBLIMAZE) injection 100 mcg  100 mcg IntraVENous Once PRN Pippa Chen Debbie Osullivan MD        lactated ringers IV soln infusion   IntraVENous Continuous Lord Leydi  mL/hr at 01/20/23 0634 New Bag at 01/20/23 0634    sodium chloride flush 0.9 % injection 5-40 mL  5-40 mL IntraVENous 2 times per day Lord Leydi MD        sodium chloride flush 0.9 % injection 5-40 mL  5-40 mL IntraVENous PRN Lord Leydi MD        0.9 % sodium chloride infusion   IntraVENous PRN Lord Leydi MD        midazolam PF (VERSED) injection 2 mg  2 mg IntraVENous Once PRN Lord Leydi MD        acetaminophen (TYLENOL) tablet 1,000 mg  1,000 mg Oral Once Maricarmen Wharton        sodium chloride flush 0.9 % injection 5-40 mL  5-40 mL IntraVENous 2 times per day Cherelle Tavera [place]        sodium chloride flush 0.9 % injection 5-40 mL  5-40 mL IntraVENous PRN JANNIE Wharton        0.9 % sodium chloride infusion   IntraVENous PRN JANNIE Wharton        ceFAZolin (ANCEF) 2000 mg in sterile water 20 mL IV syringe  2,000 mg IntraVENous On Call to [address] [place] [address]           Allergies:     Allergies   Allergen Reactions    Lisinopril Cough    Losartan Cough    Rosuvastatin Myalgia    Simvastatin Myalgia       Problem List:    Patient Active Problem List   Diagnosis Code    Chest pain of uncertain etiology E02.9    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    Hiatal hernia K44.9    Hyperlipemia E78.5    GERD (gastroesophageal reflux disease) K21.9    Osteoarthritis of right knee M17.11    Age-related osteoporosis without current pathological fracture M81.0    Essential hypertension I10    Urge incontinence of urine N39.41    Vitamin D deficiency E55.9    Primary osteoarthritis of left knee M17.12       Past Medical History:        Diagnosis Date    HTN (hypertension)     Hyperlipidemia     Urinary incontinence        Past Surgical History:        Procedure Laterality Date    APPENDECTOMY      CARDIAC CATHETERIZATION      no stents    COLONOSCOPY      FOOT SURGERY Bilateral heel spurs    HYSTERECTOMY (CERVIX STATUS UNKNOWN) Bilateral     KNEE ARTHROPLASTY Right     TONSILLECTOMY         Social History:    Social History     Tobacco Use    Smoking status: Former     Types: Cigarettes    Smokeless tobacco: Never    Tobacco comments:     Quit 2006   Substance Use Topics    Alcohol use: Never                                Counseling given: Not Answered  Tobacco comments: Quit 2006      Vital Signs (Current):   Vitals:    01/20/23 0545   BP: (!) 177/62   Pulse: 80   Resp: 18   Temp: 97.9 °F (36.6 °C)   TempSrc: Temporal   SpO2: 97%   Weight: 141 lb (64 kg)   Height: 5' (1.524 m)                                              BP Readings from Last 3 Encounters:   01/20/23 (!) 177/62   12/20/22 (!) 153/58       NPO Status: Time of last liquid consumption: 2300                        Time of last solid consumption: 2300                        Date of last liquid consumption: 01/19/23                        Date of last solid food consumption: 01/19/23    BMI:   Wt Readings from Last 3 Encounters:   01/20/23 141 lb (64 kg)   12/20/22 145 lb (65.8 kg)   08/02/22 156 lb (70.8 kg)     Body mass index is 27.54 kg/m². CBC:   Lab Results   Component Value Date/Time    WBC 7.3 12/20/2022 12:25 PM    RBC 4.44 12/20/2022 12:25 PM    HGB 13.6 12/20/2022 12:25 PM    HCT 42.2 12/20/2022 12:25 PM    MCV 95.0 12/20/2022 12:25 PM    RDW 13.2 12/20/2022 12:25 PM     12/20/2022 12:25 PM       CMP:   Lab Results   Component Value Date/Time     12/20/2022 12:25 PM    K 3.7 12/20/2022 12:25 PM     12/20/2022 12:25 PM    CO2 28 12/20/2022 12:25 PM    BUN 7 12/20/2022 12:25 PM    CREATININE 0.61 12/20/2022 12:25 PM    LABGLOM >60 12/20/2022 12:25 PM    GLUCOSE 101 12/20/2022 12:25 PM    CALCIUM 10.1 12/20/2022 12:25 PM       POC Tests: No results for input(s): POCGLU, POCNA, POCK, POCCL, POCBUN, POCHEMO, POCHCT in the last 72 hours.     Coags:   Lab Results   Component Value Date/Time    PROTIME 12.4 12/20/2022 12:25 PM    INR 0.9 12/20/2022 12:25 PM    APTT 37.6 12/20/2022 12:25 PM       HCG (If Applicable): No results found for: PREGTESTUR, PREGSERUM, HCG, HCGQUANT     ABGs: No results found for: PHART, PO2ART, BEZ4XVR, UVX5DJN, BEART, R5PPLHSU     Type & Screen (If Applicable):  No results found for: LABABO, LABRH    Drug/Infectious Status (If Applicable):  No results found for: HIV, HEPCAB    COVID-19 Screening (If Applicable): No results found for: COVID19        Anesthesia Evaluation  Patient summary reviewed and Nursing notes reviewed no history of anesthetic complications:   Airway: Mallampati: I       Mouth opening: > = 3 FB   Dental:    (+) upper dentures and lower dentures      Pulmonary:Negative Pulmonary ROS breath sounds clear to auscultation                             Cardiovascular:  Exercise tolerance: good (>4 METS),   (+) hypertension:, CAD: non-obstructive, hyperlipidemia        Rhythm: regular  Rate: normal                 ROS comment: Cath 2015- mild non-obs CAD and normal EF     Neuro/Psych:   Negative Neuro/Psych ROS              GI/Hepatic/Renal:   (+) GERD: well controlled,           Endo/Other:    (+) : arthritis: OA., .                 Abdominal:             Vascular: negative vascular ROS.         Other Findings:           Anesthesia Plan      spinal     ASA 3     (No hx of any bleeding disorder or easy bleeding)        Anesthetic plan and risks discussed with patient.              Post-op pain plan if not by surgeon: single peripheral nerve block            SALVADOR VERDUGO MD   1/20/2023

## 2023-01-20 NOTE — DISCHARGE INSTRUCTIONS
[address] [facility]   Patient Discharge Instructions    Jose Huber / [id number] : 1940    Admitted 2023 Discharged: 2023     IF YOU HAVE ANY PROBLEMS ONCE YOU ARE AT HOME CALL THE FOLLOWING NUMBERS:   Nurse's line: [phone]  Main office number: [phone]      Medications    The medications you are to continue on are listed on the medication reconciliation sheet. Narcotic pain medications as well as supplemental iron can cause constipation. If this occurs, try over the counter stool softeners or try stopping the narcotic pain medication and/or the iron. It is important that you take the medication exactly as they are prescribed. Medications which increase your risk of blood clots are listed to stop for 5 weeks after surgery as well as medications or supplements which increase your risk of bleeding complications. Keep your medication in the bottles provided by the pharmacist and keep a list of the medication names, dosages, and times to be taken in your wallet. Do not take other medications without consulting your doctor. If you need a refill on your pain medication, please note our office is closed over the weekend. It is our office policy that on-call providers cannot refill narcotic pain medications over the weekend. If you will need a refill over the weekend, please call our office before 12pm on Friday or first thing Monday morning. Important Information    Do NOT smoke as this will greatly increase your risk of infection! Resume your prehospital diet. If you have excessive nausea or vomitting call your doctor's office     Leg swelling and warmth is normal for 6 months after surgery. If you experience swelling in your leg, elevate your leg while laying down with your toes above your heart. If you have sudden onset severe swelling with leg pain call our office.  Use Bam Hose stockings until we see you in the office for your follow up appointment. The stitches deep inside take approximately 6 months to dissolve. There will be sharp shooting, stinging and burning pain. This is normal and will resolve between 3-6 months after surgery. Difficulty sleeping is normal following total Knee and Hip replacement. You may try melatonin, an over-the-counter sleep aid or benadryl to help with sleep. Most patients will resume sleeping through the night 8 weeks after surgery. Home Physical Therapy is arranged. Home Health will contact you within 48 hrs of discharge that you have chosen. If you have not received a call within this time frame please contact that provider you chose. You should be given this information before you leave the hospital.     You are at a risk for falls. Use the rolling walker when walking. Patients who have had a joint replacement should not drive if they are still taking narcotic pain mediation during the daytime hours. Most patients wean themselves off of pain medication within 2-5 weeks after surgery. You may drive once you discontinue the narcotic pain medication and feel comfortable enough going from gas to break while driving with your right leg. When to Call the office    - If you have a temperature greater then 101 + other symptoms that suggest illness such as nausea/vomiting, altered mental status, extreme fatigue, wound drainage, etc.  - Uncontrolled vomiting   - Loose control of your bladder or bowel function  - Are unable to bear any wieght       Information obtained by :  I understand that if any problems occur once I am at home I am to contact my physician. I understand and acknowledge receipt of the instructions indicated above.                                                                                                                                            Physician's or R.N.'s Signature                                                                  Date/Time Patient or Representative Signature                                                          Date/Time       Total Knee Replacement: What to Expect at [address] [place] [address] Drive had a total knee replacement. The doctor replaced the worn ends of the bones that connect to your knee (thighbone and lower leg bone) with plastic and metal parts. When you leave the hospital, you should be able to move around with a walker or crutches. But you will need someone to help you at home until you have more energy and can move around better. You will go home with a bandage and stitches, staples, skin glue, or tape strips. Change the bandage as your doctor tells you to. If you have stitches or staples, your doctor will remove them about 2 weeks after your surgery. Glue or tape strips will fall off on their own over time. You may still have some mild pain, and the area may be swollen for a few months after surgery. Your knee will continue to improve for up to a year. You will probably use a walker for some time after surgery. When you are ready, you can use a cane. You may be able to walk without support after a couple weeks, or when you are comfortable. You will need to do months of physical rehabilitation (rehab) after a knee replacement. Rehab will help you strengthen the muscles of the knee and help you regain movement. After you recover, your artificial knee will allow you to do normal daily activities with less pain or no pain at all. You may be able to hike, dance, or ride a bike. Talk to your doctor about whether you can do more strenuous activities. Always tell your caregivers that you have an artificial knee. How long it will take to walk on your own, return to normal activities, and go back to work depends on your health and how well your rehabilitation (rehab) program goes.  The better you do with your rehab exercises, the quicker you will get your strength and movement back. This care sheet gives you a general idea about how long it will take for you to recover. But each person recovers at a different pace. Follow the steps below to get better as quickly as possible. How can you care for yourself at home? Activity    Rest when you feel tired. You may take a nap, but don't stay in bed all day. When you sit, use a chair with arms. You can use the arms to help you stand up. Work with your physical therapist to find the best way to exercise. What you can do as your knee heals will depend on whether your new knee is cemented or uncemented. You may not be able to do certain things for a while if your new knee is uncemented. After your knee has healed enough, you can do more strenuous activities with caution. You can golf, but you may want to use a golf cart for some time. And don't wear shoes with spikes. You can bike on a flat road or on a stationary bike. Talk to your doctor before biking uphill. Your doctor may suggest that you stay away from activities that put stress on your knee. These include tennis, badminton, contact sports like football, jumping (such as in basketball), jogging, and running. Avoid activities where you might fall. Do not sit for more than 1 hour at a time. Get up and walk around for a while before you sit again. If you must sit for a long time, prop up your leg with a chair or footstool. This will help you avoid swelling. Ask your doctor when you can drive again. It may take several weeks after knee replacement surgery before it's safe for you to drive. When you get into a car, sit on the edge of the seat. Then pull in your legs, and turn to face the front. You should be able to do many everyday activities 3 to 6 weeks after your surgery. You will probably need to take 4 to 16 weeks off from work.  When you can go back to work depends on the type of work you do and how you feel. Ask your doctor when it is okay for you to have sex. For 12 weeks, do not lift anything heavier than 10 pounds and do not lift weights. Diet    By the time you leave the hospital, you should be eating your normal diet. If your stomach is upset, try bland, low-fat foods like plain rice, broiled chicken, toast, and yogurt. Your doctor may suggest that you take iron and vitamin supplements. Drink plenty of fluids (unless your doctor tells you not to). Eat healthy foods, and watch your portion sizes. Try to stay at your ideal weight. Too much weight puts more stress on your new knee. You may notice that your bowel movements are not regular right after your surgery. This is common. Try to avoid constipation and straining with bowel movements. Drinking enough fluids, taking a stool softener, and eating foods that are good sources of fiber can help you avoid constipation. If you have not had a bowel movement after a couple of days, talk to your doctor. Medicines    Your doctor will tell you if and when you can restart your medicines. You will also get instructions about taking any new medicines. If you stopped taking aspirin or some other blood thinner, your doctor will tell you when to start taking it again. Your doctor may give you a blood-thinning medicine to prevent blood clots. If you take a blood thinner, be sure you get instructions about how to take your medicine safely. Blood thinners can cause serious bleeding problems. This medicine could be in pill form or as a shot (injection). If a shot is needed, your doctor will tell you how to do this. Be safe with medicines. Take pain medicines exactly as directed. If the doctor gave you a prescription medicine for pain, take it as prescribed. If you are not taking a prescription pain medicine, ask your doctor if you can take an over-the-counter medicine. Plan to take your pain medicine 30 minutes before exercises. It is easier to prevent pain before it starts than to stop it after it has started. If you think your pain medicine is making you sick to your stomach: Take your medicine after meals (unless your doctor has told you not to). Ask your doctor for a different pain medicine. If your doctor prescribed antibiotics, take them as directed. Do not stop taking them just because you feel better. You need to take the full course of antibiotics. Incision care    If your doctor told you how to care for your cut (incision), follow your doctor's instructions. You will have a dressing over the cut. A dressing helps the incision heal and protects it. Your doctor will tell you how to take care of this. If you did not get instructions, follow this general advice: If you have strips of tape on the cut the doctor made, leave the tape on for a week or until it falls off. If you have stitches or staples, your doctor will tell you when to come back to have them removed. If you have skin glue on the cut, leave it on until it falls off. Skin glue is also called skin adhesive or liquid stitches. Change the bandage every day. Wash the area daily with warm water, and pat it dry. Don't use hydrogen peroxide or alcohol. They can slow healing. You may cover the area with a gauze bandage if it oozes fluid or rubs against clothing. You may shower 24 to 48 hours after surgery. Pat the incision dry. Don't swim or take a bath for the first 2 weeks, or until your doctor tells you it is okay. Exercise    Your rehab program will give you a number of exercises to do to help you get back your knee's range of motion and strength. Always do them as your therapist tells you. Ice    For pain and swelling, put ice or a cold pack on the area for 10 to 20 minutes at a time. Put a thin cloth between the ice and your skin. If your doctor recommended cold therapy using a portable machine, follow the instructions that came with the machine. Other instructions    Wear compression stockings if your doctor told you to. These may help to prevent blood clots. Your doctor will tell you how long you need to keep wearing the compression stockings. Carry a medical alert card that says you have an artificial joint. You have metal pieces in your knee. These may set off some airport metal detectors. Follow-up care is a key part of your treatment and safety. Be sure to make and go to all appointments, and call your doctor if you are having problems. It's also a good idea to know your test results and keep a list of the medicines you take. When should you call for help? Call 911 anytime you think you may need emergency care. For example, call if:    You passed out (lost consciousness). You have severe trouble breathing. You have sudden chest pain and shortness of breath, or you cough up blood. Call your doctor now or seek immediate medical care if:    You have signs of infection, such as: Increased pain, swelling, warmth, or redness. Red streaks leading from the incision. Pus draining from the incision. A fever. You have signs of a blood clot, such as:  Pain in your calf, back of the knee, thigh, or groin. Redness and swelling in your leg or groin. Your incision comes open and begins to bleed, or the bleeding increases. You have pain that does not get better after you take pain medicine. Watch closely for changes in your health, and be sure to contact your doctor if:    You do not have a bowel movement after taking a laxative. Where can you learn more? Go to http://www.woods.com/ and enter T054 to learn more about \"Total Knee Replacement: What to Expect at Home. \"  Current as of: March 9, 2022               Content Version: 13.5  © 0241-7535 [employer]. Care instructions adapted under license by [facility] ([facility]).  If you have questions about a medical condition or this instruction, always ask your healthcare professional. [address] any warranty or liability for your use of this information.

## 2023-01-20 NOTE — ANESTHESIA POSTPROCEDURE EVALUATION
Department of Anesthesiology  Postprocedure Note    Patient: Little Garcia  MRN: [id number]  YOB: 1940  Date of evaluation: 1/20/2023      Procedure Summary     Date: 01/20/23 Room / Location: [facility] MAIN OR 01 / [facility] MAIN OR    Anesthesia Start: 0226 Anesthesia Stop: 1033    Procedure: lt KNEE TOTAL ARTHROPLASTY ROBOTIC (Left: Knee) Diagnosis:       Primary osteoarthritis of left knee      (Primary osteoarthritis of left knee [M17.12])    Surgeons: Dagoberto Hernandez MD Responsible Provider: Carline Busch MD    Anesthesia Type: Spinal ASA Status: 3          Anesthesia Type: Spinal    Ana Rosa Phase I: Ana Rosa Score: 9    Ana Rosa Phase II:        Anesthesia Post Evaluation    Patient location during evaluation: PACU  Patient participation: complete - patient participated  Level of consciousness: awake  Airway patency: patent  Nausea & Vomiting: no nausea  Complications: no  Cardiovascular status: hemodynamically stable  Respiratory status: acceptable and nonlabored ventilation  Hydration status: stable  Multimodal analgesia pain management approach

## 2023-01-20 NOTE — RT PROTOCOL NOTE
Respiratory Care Services     Policy Number: [id number]    Title: Oxygen Protocol    Effective Date: 01/1996    Revised Date: 06/2013, 02/29/2016, 4/2018, 7/2019    Reviewed Date: 05/2014, 03/2015, 06/2017, 11/2020        I. Policy: The Oxygen Protocol will be initiated for all patients upon written order from physician for administration of oxygen therapy or if a patient is found to have an oxygen saturation of 88% or less. Special consideration: the goal of oxygen therapy for COPD patients is to maintain oxygen saturation between 88% - 92% to comply with GOLD Guidelines. II. Purpose: To provide protocol driven respiratory therapy for the administration of oxygen at concentrations greater than that in ambient air with the intent of treating or preventing the symptoms and manifestations of hypoxia. III. Responsibility: Director Respiratory Care Services, all Respiratory Care Practitioners     IV. Indications:   Implement this protocol for patients when physician orders oxygen to be administered or when patient is found to have an oxygen saturation of 88% or less. To assure routine monitoring of patient's oxygen saturation b.i.d. and to make appropriate adjustments in accordance with ordered oxygen saturation parameters. To assure continuity of respiratory care that meets [facility] Clinical Practice Guidelines and GOLD Guidelines. Hb < 8  Sickle Cell anemia crisis    V. Assessment:  Assess the following parameters to determine the need to adjust oxygen:  Measurement of patient's oxygen saturation via pulse oximetry. Observation of patient's color, respiratory effort, and responsiveness. Measurement of heart rate and respiratory rate. Complete a three-step ambulatory oxygen saturation when ordered. VI. Initiation:  Upon receipt of an order for oxygen, the RCP will:   Verify order in the patient's EMR, which should include the desired oxygen saturation to be maintained.   The patient shall be placed on oxygen with humidity (except for those oxygen delivery devices that do not require humidity, i.e. venturi masks and non-rebreather masks) as ordered by the physician to achieve the prescribed oxygen saturation. In the event that no saturation is specified, a saturation of 90% will be maintained. Patients, who are found to have a SaO2 of 88% or less, may be started on supplemental oxygen as described above. Patients admitted with cardiac problems/disease shall be maintained at 92% per Chest Committee recommendation. The patient will be informed of the \"no smoking policy\" and instructed in the proper use of oxygen therapy. Once desired oxygen saturation has been achieved, the RCP will document FIO2 and oxygen saturation in the respiratory section of the patient's EMR. VII. Maintenance:   30-second oxygen saturation check will be taken to maintain the saturation ordered by the physician each day. Patients will be assessed each shift and as needed by pulse oximetry to determine if oxygen needs to be decreased, increased or discontinued. If changes in FIO2 are indicated, all changes will be documented in the respiratory section of the patient's EMR. If no changes in FIO2 are required, the patient's oxygen flow rate and saturation will be recorded in the respiratory section of the patient's EMR. Per [facility] Pulmonary, patients who are receiving oxygen therapy but are not on oxygen at home, should be weaned off oxygen as soon as possible or when anticipated discharge becomes evident. Oxygen will be discontinued after oxygen saturation has been maintained for 24 hours on room air and documented in the patient's EMR. Patients on the Inpatient Rehabilitation area on 9th floor will be exempt from having their oxygen discontinued per protocol. Oxygen may be weaned but will be changed to prn to meet the needs of the patient when exercising and participating in therapy.   The goal of oxygen therapy is to maintain patients with a diagnosis of COPD at oxygen saturation between 88% - 92% to comply with GOLD Guidelines. VIII. Safety: RCP will address the following safety issues:  Identify patient using the two patient identifiers name and birth date via ID bracelet. Perform hand hygiene per hospital policy utilizing Standard Precautions for all patients and following transmission-based isolation as indicated per hospital policy. Cardiac patients will be maintained at an oxygen saturation of 92%. If a patient's FIO2 requirements necessitate changing oxygen delivery devices to a high concentration of oxygen, documentation indicating the change must be included in the progress notes, as well as in the respiratory flowsheet. If a patient has a hemoglobin level <8 mg. RCP will consult physician before discontinuing oxygen. IX. Interventions:   RCP will assess patient for signs of respiratory distress or suspicion of CO2 retention. An ABG may be obtained for patients exhibiting respiratory distress or sickle cell crisis. An order should be entered into patient's EMR for ABG under per protocol. X. Documentation  Document assessment findings in the respiratory section of the patient's EMR. Document changes in therapy per protocol in the respiratory orders section and in the care plan section of the patient's EMR. Document patient education in the patient education section of the patient's EMR. XI. Reportable Conditions:  Report to the physician immediately:  Acute changes in patient's respiratory status. An oxygen saturation <85%. A change in oxygen delivery device to provide a high concentration of oxygen. XII.  Patient Instructions: Review with Patient  Purpose of oxygen therapy  Proper technique for using oxygen  No smoking policy    Approval: Pulmonary Committee (1-25-96)  Revision: Chest Committee (4-28-05)    L - Respiratory Care Department Policy, Procedure and Protocol Guideline Manual, 1995, Terri Noonan. L - Therapist Driven Respiratory Care Protocols - A Practitioner's Guide for Criteria-Based       Respiratory Care by Marilee Schreiber M.D., and JESSICA Tinsley RRT. L - The rationale for therapist-driven protocols: an update. Respiratory Care 1998. [facility] Clinical Practice Guidelines. Respiratory Care Services       Policy Number: [id number]    Title: Patient Care Assessment Protocol    Effective Date: 01/1999    Revised Date: 05/2014, 04/2018, 12/2018, 07/2019    Reviewed Date: 06/2013/ 03/2015, 03/2016, 06/2017, 11/2020        Overview  In an effort to improve quality and reduce costs of respiratory care at [facility], the Respiratory Department has developed a number of Patient Care Protocols. These protocols have been developed according to Lashae 3 and are utilized for those patients who are ordered respiratory therapy using therapeutic indications and standardized approaches for accomplishing objectives. Patient Care Protocols are intended to improve care by:  Defining the indications and standards of care agreed upon by the Pulmonary Medicine and [address] [place] [address] of [facility]. Training respiratory care practitioners to apply those criteria to individual patients and modify therapy as indicated by the protocols. Documenting the indication and care plan as part of the initial ordering process. Tapering or discontinuing treatments once the indication for therapy changes. The Patient Care Protocols shall be universally applied throughout the hospital as determined by   the Pulmonary Medicine and [address] [place] [address].     Rationale for Patient Care Assessment Protocols:  Continuous Quality Improvement  Cost containment  Standardization of care  Enhanced continuity of care  Utilization review  Timely intervention    The following patient care assessment protocols have been developed:  Aerosolized Medication Protocol   Bronchial Hygiene Protocol   Oxygen Protocol  CVRU Fast Track Weaning Protocol   Asthma Treatment Protocol ER  Pediatric Asthma Treatment Protocol ER  Alpha-1 Antitrypsin Deficiency Protocol  Prone Positioning Protocol   COPD Protocol   Home Oxygen Assessment Protocol  Ventilator Weaning Protocol   Lung Volume Expansion Protocol    The Director of Respiratory Care Services oversees the Patient Care Assessment Program. The Respiratory Educator is responsible for protocol development and training. The Supervisor is responsible for implementation and  activities. Each patient with an order for respiratory treatments will receive an evaluation. Respiratory Care Practitioners (RCP's) will perform the evaluations. The same evaluation tool will be utilized for initial and follow-up assessments. If the patient does not meet criteria for ordered therapy, the therapy will be discontinued. If the patient demonstrates an adverse response to initially ordered therapy, the therapy will be discontinued and the physician will be contacted. Specific physician's orders that deviate from protocols and are deemed \"inappropriate\" or \"unsafe\" will be addressed with ordering physician and/or medical director as required. Respiratory Patient Care Assessment Protocols    I. Policy: In an effort to provide quality patient care and effective utilization of services, physicians who order respiratory therapy will have their patients treated via the protocols established (see attached) Respiratory Care Practitioners (RCP's) will complete the initial assessment which will indicate patient needs,  the care plan developed and will performed within 24 hours of admission. Frequency of the therapy will be set according to the results of the respiratory therapy evaluation and frequency guidelines policy.  Reassessment will be continued every 48 hours and more frequently as needed for the individual patient. II. Purpose: To provide a process that will allow for ongoing assessment and care plan modification for patients receiving respiratory services based on both objective and or subjective patient responses to interventions. This process of protocol utilization will assist in patient care progression while eliminating the need for the physician to continually update respiratory therapy orders. To assure continuity of respiratory care that meets [facility] Clinical Practice Guidelines. III. Initiation:  Implement Respiratory Care Protocols for patients who are ordered by physician          to receive respiratory therapy procedures or for ventilator management. IV. Protocol:  Upon receiving an order for therapy the RCP will review the patient's EMR (electronic medical record) for all pertinent information including:  [de-identified] order for therapy  Patient history and physical examination  Physician progress notes  Diagnostic. X-rays, PFT's, arterial blood gases etc.  The RCP will perform a respiratory assessment in the following manner:  General observations: color, pattern and effort of breathing, chest expansion, (symmetrical and bilateral), level of consciousness and the ability to ambulate. The RCP will assess patient's cough ability and determine if bronchial hygiene is needed. If patient is unable to produce sputum, at that time, the RCP should question the patient with regard to their sputum: production, color consistency, frequency and amount. Auscultation: Using a stethoscope, the RCP will listen and note quality of breath sounds and presence or absence of adventitious breath sounds in all lung fields, both anteriorly and posteriorly. Upon completing the EMR review and physical assessment, the RCP will document findings in the RT Assessment section of the EMR.  The score level will be provided and will be used to determine the frequency of therapy. V. Indications:   A. Bronchial Hygiene Protocol indications:   Potential for or presence of atelectasis. Need for hydration and removal of retained secretions. Need for improvement of cough effectiveness. Presence of conditions associated with disorder of pulmonary clearance:  Cystic fibrosis  Bronchiectasis  Neuromuscular disease  Obstructive lung diseases  Restrictive lung diseases   Aerosolized Medication(s) Protocol indications:Treatment of bronchospasm/wheezing  Improvement of mucociliary clearance  Treatment of stridor  History of Bronchiectasis, Asthma or COPD  Oxygen Therapy Protocol indications:   Documented hypoxemia  Severe trauma  Acute myocardial infarction  Short-term therapy (e.g. post anesthesia recovery)  CVRU Ventilator Weaning Protocol indications:  1. All mechanically ventilated surgical patients unless they have a no wean order. E. Asthma Treatment Protocol ER indications:  1. Patients 15years of age and older that have been triaged or diagnosed with   asthma exacerbation shall be indicated for the ER Asthma Treatment Protocol. A physician order will be required to initiate the protocol. F. Pediatric Asthma protocol in the ER indications:  1. Patients less than 15years old that have been triaged or diagnosed with asthma exacerbation shall be indicated for the Pediatric Asthma protocol. A physician order will be required to initiate the protocol. G. Alpha-1 Antitrypsin Deficiency Testing protocol indications:         1. Patients admitted and diagnosed with COPD. H. Prone Positioning Protocol indications:         1. Acute lung injury         2. Acute respiratory distress syndrome (ARDS)   I. Respiratory Care COPD Protocol indications:         1. History of COPD in patient's records         2. Smoking history   J. Home Oxygen Assessment Protocol indications:         1. Chronic lung disease         2. Cor pulmonale         3.  Unable to wean to room air 48 hours prior to anticipated discharge. K.  Ventilator Weaning Protocol indications:         1. Patient's mechanically ventilated          2. Managed by intensivist  L. Lung Volume Expansion Protocol indications:        1. Any patient at risk for pulmonary complications. VI. Maintenance:    Timely patient assessment is an integral part of this protocol therefore the following will be applied: All non- critical care patients will be evaluated upon receiving initial respiratory care orders within 24 hours and re-evaluated within 48 hours (or more as needed). Orders requesting a Respiratory Consult will be responded to in the following manner: In patient emergency situations, the RCP assigned to the floor will respond immediately to the patient, provide an initial respiratory assessment, and contact the patient's physician as necessary for appropriate orders. In non-emergent situations, the RCP assigned to the floor will respond to the patient within 90 minutes and provide an initial respiratory assessment and contact patient's physician as necessary for appropriate orders. An RCP will provide a comprehensive assessment as soon as possible. Upon completion of an evaluation, the RCP will complete documentation in the patient's EMR in the RT Assessment section. The RCP who completes the assessment will document orders for therapy in the orders section of the patient's EMR selecting new order. Next, per protocol should be selected indicating it is a protocol order and sign orders should be selected to complete the process. The applicable protocol must be added to the progress note per Joint Commission guidelines. The Pharmacy and Therapeutics (P&T) Committee has mandated that the medication Xopenex may be changed to unit dose albuterol without an order, except for those patients receiving Xopenex due to cardiac arrhythmias.    The dosage for these patients should be 0.63 mg. and may be changed from 1.25 mg. to 0.63 mg per P & T Committee by the RCP completing the assessment. Patients who are not experiencing cardiac arrhythmias, and are ordered Xopenex and Atrovent may be changed to Duoneb. VII. Safety: The following safety issues shall be monitored: The RCP will perform hand hygiene per hospital policy utilizing Standard Precautions for all patients and following transmission-based isolation as indicated per hospital policy. The RCP must exercise professional judgment in classifying the patient for frequency of therapy. Appropriate classification of the patient will require an evaluation utilizing the Therapy Assessment Protocol Guidelines. The RCP will confer with the physician concerning the care of the patient at any time questions or problems arise. If during therapy, the patient exhibits no improvement, or deterioration in clinical status the RCP will notify the physician and the patient's nurse. VIII. Interventions: The patient's nurse is responsible concerning all items related to his/her care. Ongoing communication with nursing is essential to successful protocol management. The RCP recognizes the value of the team approach in meeting the patient's needs. Nursing input regarding the patient's pulmonary condition will be sought as needed. IX. Reportable conditions: The RCP will inform the physician if:  There are acute changes in patient's respiratory status. The therapist is unable to determine appropriate care plan upon assessment. The patient fails to reach therapeutic objective. A change or additional medication is needed. X.  Patient Education:    Patient will receive instruction on the following: The treatment modality, including objectives and proper technique of therapy  Respiratory medications  Documentation shall occur in the patient education section of the patient's EMR. XI. Documentation: Record all findings as described above in the patient's EMR.     Related Protocols: A. Aerosolized Medication Protocol  Bronchial Hygiene   Oxygen Protocol   [facility] Fast Track Weaning Protocol  Asthma Treatment protocol ER  Alpha-1 antitrypsin Deficiency Protocol  Prone Positioning Protocol  Respiratory Care COPD Protocol  Home Oxygen assessment Protocol  Ventilator Weaning Protocols   Volume Expansion protocol    Indications      Frequency          Level  A. Aerosol therapy   1.  q4h     Severe SOB, wheezing, unable to sleep 1   2.  qid, q4 wa or q6h   Moderate SOB, wheezing   2   3.  tid      Hx of asthma, or COPD mild wheezing,         or facilitate secretion removal              3   4.  bid      Asthma, or COPD, Intermittent wheezing 4   5. PRN, i.e. tid PRN, qid PRN Asthma, or COPD, occasional wheezing 5       B. Bronchopulmonary Hygiene    1. q4h             Copious secretions, SOB, unable to sleep 1   2. qid & PRN            Moderate amounts of secretions   2   3. tid           Small amounts of secretions and poor cough,               history of secretions    3    4. PRN, i.e. tid PRN, qid PRN     Breathing exercises, encourage cough only 4      C. Oxygen Therapy     Follow hospital approved Oxygen Protocol      Note:  qid treatments are due 0800, 1200, 1600, and 2000. tid treatments are due 0800, 1400, and 2000  Q6h treatments are due 0800, 1400, 2000, 0200  Q4 wa teatments are due 0800, 1200, 1600, and 2000. Q4h treatments are due  0800, 1200, 1600, 2000, 0000, and 0400. The Level 1-5 rating system is only to be used as criteria for determining appropriate frequency of therapy. References:   N   Joint Commission Consolidated [place] Standard   L    Respiratory Care Department Policy, Procedure and Protocol Guideline Manual, 1995, JESSICA Sparrow. L  Therapist Driven Respiratory Care Protocols - A Practitioner's Guide for Criteria-Based Respiratory Care by Kevin Crawford M.D., and JESSICA Chappell RRT. L  The rationale for therapist-driven protocols: an update.  Respiratory Care 1998; 32:140-530   L Therapist Driven Respiratory Care Protocols - A Practitioner's Guide for Criteria-Based Respiratory Care by Mark Graham M.D., and KALIN Mancilla The rationale for therapist-driven protocols: an update. Respiratory Care 1998; [id number]. N   [facility] Clinical Practice Guidelines. The RCP will perform a respiratory assessment in the following manner:  Perform hand hygiene per hospital policy utilizing Standard Precautions for all patients and following transmission-based isolation as indicated per policy. Identify patient via ID bracelet verifying patient name and birth date. General observations: color, pattern and effort of breathing, chest expansion, (symmetrical and bilateral), level of consciousness and the ability to ambulate. The RCP will assess patients cough ability and determine if Nasotracheal suctioning is needed. If patient is unable to produce sputum, at that time, the RCP should question the patient with regard to their sputum: production, color consistency, frequency and amount. Auscultation: Using a stethoscope, the RCP will listen and note quality of breath sounds and presence or absence of adventitious breath sounds in all lung fields, both anteriorly and posteriorly. Upon completing the EMR review and physical assessment, the RCP will document findings in the RT Assessment section of the EMR. The score level will be provided and will be used to determine the frequency of therapy. V. Indications:   A. Indications for Bronchial Hygiene Protocol will include:  Potential for or presence of atelectasis. Need for hydration and removal of retained secretions. Need for improvement of cough effectiveness.   Presence of conditions associated with disorder of pulmonary clearance:  Cystic fibrosis  Bronchiectasis   Indications for Aerosolized Medication(s) Protocol should include:  Treatment of bronchospasm/wheezing  Improvement of mucociliary clearance  Treatment of stridor  History of Asthma or COPD             C.  Indications for Oxygen Therapy Protocol should include:  Documented hypoxemia  Severe trauma  Acute myocardial infarction  Short-term therapy (e.g. post anesthesia recovery)    VI. Maintenance:    Timely patient assessment is an integral part of this protocol therefore the following will be applied: All non- critical care patients will be evaluated upon receiving initial respiratory care orders within 24 hours and re-evaluated within 48 hours (or more as needed). Orders requesting a Respiratory Consult will be responded to in the following manner: In patient emergency situations, the RCP assigned to the floor will respond immediately to the patient, provide an initial respiratory assessment, and contact the patients physician as necessary for appropriate orders. In non-emergent situations, the RCP assigned to the floor will respond to the patient within 90 minutes and provide an initial respiratory assessment and contact patients physician as necessary for appropriate orders. An RCP will provide a comprehensive assessment as soon as possible. Upon completion of an evaluation, the RCP will complete documentation in the patients EMR in the RT Assessment section. The RCP who completes the assessment will document orders for therapy in the orders section of the patients EMR selecting new order. Next, per protocol should be selected indicating it is a protocol order and sign orders should be selected to complete the process. The Pharmacy and Therapeutics (P&T) Committee has mandated that the medication Xopenex may be changed to unit dose albuterol without an order, except for those patients receiving Xopenex due to cardiac arrhythmias. The dosage for these patients should be 0.63 mg. and may be changed from 1.25 mg. to 0.63 mg per P & T Committee by the RCP completing the assessment.   Patients who are not experiencing cardiac arrhythmias, and are ordered Xopenex and Atrovent may be changed to Duoneb. VII. Safety: The following safety issues shall be monitored: The RCP will perform hand hygiene per hospital policy utilizing Standard Precautions for all patients and following transmission-based isolation as indicated per hospital policy. The RCP must exercise professional judgment in classifying the patient for frequency of therapy. Appropriate classification of the patient will require an evaluation utilizing the Therapy Assessment Protocol Guidelines. The RCP will confer with the physician concerning the care of the patient at any time questions or problems arise. If during therapy, the patient exhibits no improvement or deterioration in clinical status the RCP will notify the physician and the patients nurse. VIII. Interventions: The patients nurse is responsible concerning all items related to his/her care. Ongoing communication with nursing is essential to successful protocol management. The RCP recognizes the value of the team approach in meeting the patients needs. Nursing input regarding the patients pulmonary condition will be sought as needed. IX. Reportable conditions: The RCP will inform the physician if:  There are acute changes in patients respiratory status. The therapist is unable to determine appropriate care plan upon assessment. The patient fails to reach therapeutic objective. A change or additional medication is needed. X.  Patient Education:    Patient will receive instruction on the following: The treatment modality, including objectives and proper technique of therapy  Respiratory medications  Documentation shall occur in the patient education section of the patients EMR. XI. Documentation: Record all findings as described above in the patients EMR. Related Protocols: A.  Aerosolized Medication Protocol  Bronchial Hygiene  Oxygen Protocol   Volume Expansion/Secretion Clearance  Ventilator Weaning Protocols    References:  N   Joint Commission Consolidated [place] Standard   L    Respiratory Care Department Policy, Procedure and Protocol Guideline Manual, 1995, JESSICA BUI  Therapist Driven Respiratory Care Protocols - A Practitioners Guide for Criteria-Based Respiratory Care by Antonia Harden M.D., and KALIN Greene  The rationale for therapist-driven protocols: an update. Respiratory Care 1998; [facility] Guidelines. Respiratory Care Services     Policy Number: [id number]    Title: Bronchial Hygiene Protocol    Effective Date: 01/1999    Revised Date: 12/2014, 11/2017, 7/2019    Reviewed Date: 06/2013, 05/2014, 03/2015, 03/2016, 06/2017, 4/2018, 11/2020     I. Purpose: The Respiratory Care Practitioner (RCP) will utilize the following protocol to select and initiate bronchial hygiene therapy to open and maintain obstructed airways when indicated. II. Patients: All patients who are ordered bronchial hygiene therapy. III. Clinical Area: All general patient floors     IV. Protocol: The following conditions or diseases are indications for bronchial hygiene                           therapy. Oscillating PEP Therapy        Indications should include: Atelectasis caused by mucus plugging or foreign body  Chronic mucociliary clearance disorders  Retained secretions which may be associated with the following conditions:  Bronchitis  Bronchiectasis  Pneumonia  PAP- Positive airway pressure therapy  Indications include:  Patients with post-operative atelectasis or to prevent post operative atelectasis. Patients who cannot perform deep breathing exercises due to pain. Patients requiring lung expansion therapy who cannot follow instructions. Patients requiring lung expansion therapy with poor inspiratory capacity <10cc/kg.   Patients requiring aerosol therapy in conjunction with opening their airways. Vibratory / Acoustical Airway Clearance Therapy (ACT)- i.e. (Vibralung, Vest, or Percussor)  Indications should include  Patient conditions  that involve retained secretions, increased mucus production and defective mucociliary clearance such as:  Cystic fibrosis  Chronic bronchitis  Bronchiectasis  Pneumonia  Asthma  Muscular dystrophy  Post-operative atelectasis  Neuromuscular respiratory impairments  ACT may be considered in patients with COPD with  symptomatic secretion retention, guided by patient preference,  toleration, and effectiveness of therapy Liliya Jalloh et al., 2013). Nasotracheal suctioning indications should include:  Inability to cough effectively  Excessive secretions  Artificial airway      V. Equipment:   A. PEP therapy device   B. Vest therapy equipment   Larraine Bills   D. AccuPap   Lorena Hardwick NT suction equipment       VI. Guidelines:   Monitor patient's vital signs and evaluate patient's clinical status. The need to                                change therapy modality may be indicated by:  Change in patient's sensorium (patient now confused or obtunded, and unable to follow directions). A significant deterioration is evident on patient's chest radiograph or increased sputum production. Increased thickening of secretions (e.g. mucolytic therapy may be indicated.)  Development of wheezing  Decrease in oxygen saturation  Development of chest pain. VII. Clinical Responsibility: The Therapy Assessment Protocol guidelines will be used to                re-evaluate all patients on bronchial hygiene therapy (See Therapy Assessment Protocol). RCP's will perform changes in therapy according to protocol. .  Bronchial hygiene therapy may be discontinued when goals of therapy are met, e.g., secretions easily expectorated for 48 hours, atelectasis is resolved, etc.  PAP Therapy may be utilized in place of IPPB therapy per discretion of the RCP, as approved by the Pulmonary [facility] and [address] [place] [address]. VIII. Outcome Criteria:  Outcome criteria for bronchial hygiene therapy should include:  Decrease in sputum production  Improved breath sounds  Improved arterial oxygen tension and/or SaO2  Improved chest X-ray  Subjective response to therapy    IX. Documentation  Document assessment findings in the respiratory assessment section of the patient's EMR. Document changes in therapy per protocol in the respiratory orders section and in the care plan section of the patient's EMR. Document patient education in the patient education section of the patient's EMR. X. Related Protocols:  Respiratory Patient Care Assessment Protocols  Aerosolized Medication Protocol  Oxygen Therapy Protocol        Reference:    L - Respiratory Care Department Policy, Procedure and Protocol Guideline Manual, 1995, JESSICA Sparrow. L - Therapist Driven Respiratory Care Protocols - A Practitioner's Guide for Criteria-Based         Respiratory Care by Yareli Langston M.D., and JESSICA Garner, KEIOK. N - [facility] Clinical Practice Guidelines. Dangelo Vogel., Nick Ontiveros., Jude Toure., Jj Soto., Eran Gipson., . . . DEEPTI Jackman (2013, December). [facility] Clinical Practice Guideline: Effectiveness of Nonpharmacologic Airway Clearance Therapies in Hospitalized Patients. Respiratory Care, 58(12), 1503-8783. Retrieved June 28, 2019        Respiratory Care Services Policy Number: [id number]    Title: Aerosolized Medication Protocol    Effective Date: 10/1998    Revised Date: 06/13, 03/16, 11/17, 07/19     Reviewed Date: 05/14/ 03/15 , 06/17, 5/18, 11/2020   I. Policy: The Aerosolized Medication Protocol shall by implemented by Respiratory Care Practitioners (RCP) for patients with orders to receive aerosol therapy with medication. II. Purpose:   To open and maintain obstructed airways, the RCP, will utilize the following   protocol to select the indicated aerosolized medication(s) and determine the most effective method of delivery to the patient. III. Patient Type: All patients who are determined to meet aerosolized medication criteria as          outlined in this protocol. IV. Responsibility: Director, [address], registered Respiratory Care Practitioners (RCP's) with documented competency in the performance of                                     respiratory therapeutic techniques. V. Equipment needed:  Stethoscope  Pulse oximeter  AeroEclipse nebulizer  Meter Dose Inhaler (MDI)    VI. Protocol: The following conditions are accepted indications for aerosolized medication therapy. Bronchospasm/wheezing  Impaired mucociliary clearance  Tracheobronchial mucosal congestion/and laryngeal stridor  Diseases which commonly require aerosolized medication therapy include, but are not limited to:  Asthma/reactive airway disease  Bronchitis/emphysema (COPD)  Cystic fibrosis  Severe laryngitis/tracheitis  Bronchiectasis  Smoke inhalation or chemical trauma to the lung or upper airway  Physical trauma to the upper airway  Laryngotracheobronchitis  Bronchiolitis  Non-specific wheezing              B. Indications for bronchodilator medications will include:  Bronchospasm/ wheezing  Asthma/reactive airway disease  Chronic obstructive pulmonary disease  Obstructive defect on pulmonary function testing  Administration of medications  If a bronchodilator or any other type of respiratory medication is needed, a physician order must be indicated in the medication section in the patient's EMR. When the physician specifies the medication and dosage at the time of request, the ordered medication will be used as part of the care plan. The following guidelines will be utilized in the evaluation and selection of the appropriate delivery device for indicated medication(s):  MDI is the preferred method of medication delivery via common canister.   Patient should be alert/cooperative  Able to perform 3 second breath hold. Patient may be changed to self-administer as appropriate. Patients in isolation will be provided an individual inhaler. Unassisted aerosol (UA) is indicated if  Ventilation is inadequate  Patient is unable to perform MDI appropriately     VII. Guidelines:   Monitor patient's vital signs and evaluate patient's clinical status. The need to change medication and/or modality may be indicated by:  A pulse greater than 120 bpm, or if a pulse increase of 20 bpm occurs with bronchodilator medications. Significant worsening of dyspnea or wheezing occurring during or within 30 minutes of discontinuing therapy. Worsening of patient's sensorium (e.g. patient becomes confused or obtunded, and unable to follow directions). Worsening of patient's chest x-ray. Change in sputum (e.g. increased pulmonary infiltrate, which might indicate need for volume expansion therapy). Patient has difficulty coughing up secretions, which might indicate need for acetylcysteine and/or bronchial hygiene therapy. Call physician immediately if dyspnea worsens and is not responsive to modifications allowed by protocol. VIII. Clinical Responsibility:  The therapy assessment guidelines will be used to evaluate all patients receiving aerosolized medications with the exception of critical care areas. RCP's will perform changes in therapy per protocol. It will be the responsibility of RCP to provide instruction regarding respiratory medications, possible side effects, aerosol therapy and proper MDI technique, as well as, spacer usage to patients ordered MDI therapy. Current therapy that is part of a patient's home regimen will not be discontinued. Provide spacer and educate patient on proper inhaler technique if needed. IX. Documentation  Document assessment findings in the respiratory assessment section of the patient's EMR.   Document changes in therapy per protocol in the respiratory orders section and in the care plan section of the patient's EMR. Document patient education in the patient education section of the patient's EMR. X. Outcome Criteria:  Relief of wheezes and obstruction  Improved cough and sputum color and consistency  Improved chest x-ray  Improved arterial oxygen tension and or SaO2  Improved Peak Flow on asthmatic patients        XI. Related Policy/Protocols:  Respiratory Patient Care Protocols  Bronchial Hygiene Therapy  Oxygen Protocol  [facility] Administration of Metered Dose Inhalers via a Common Canister  [facility] Clinical Resources   [facility] Aerosol Generating Procedures  Reference:  [address] [place] [address], Procedure and Protocol Guideline Manual, 1995, JESSICA Sparrow. L -  Therapist Driven Respiratory Care Protocols - A Practitioner's Guide for Criteria-Based Respiratory Care by Jorge De La O M.D., and JESSICA Wiggins, RRT. L - The rationale for therapist-driven protocols: an update. Respiratory Care 1998; [id number]. N -[facility] Clinical Practice Guidelines.

## 2023-01-20 NOTE — CARE COORDINATION
Patient is a 80y.o. year old female admitted for Left TKA . Patient plans to return home on discharge. Order received to arrange home health. Patient without preference towards agency. Referral sent to [facility] who covers . Patient requesting we arrange a Jr walker and bedside commode. Pt without preference towards provider. Referral sent to [address] [place], [place] [address] delivered to the hospital room prior to discharge. Will follow until discharge. 01/20/23 9746   Service Assessment   Patient Orientation Alert and Oriented   Cognition Alert   History Provided By Patient   Primary Caregiver Self   Support Systems Spouse/Significant Other;Children   PCP Verified by CM No   Prior Functional Level Independent in ADLs/IADLs   Current Functional Level Independent in ADLs/IADLs   Can patient return to prior living arrangement Yes   Ability to make needs known: Good   Family able to assist with home care needs: Yes   Would you like for me to discuss the discharge plan with any other family members/significant others, and if so, who? No   Social/Functional History   Lives With Spouse   Services At/After Discharge   Transition of Care Consult (CM Consult) [facility]   Mode of Transport at Discharge Self   Condition of Participation: Discharge Planning   The Plan for Transition of Care is related to the following treatment goals: improve mobility   The Patient and/or Patient Representative was provided with a Choice of Provider? Patient   The Patient and/Or Patient Representative agree with the Discharge Plan? Yes   Freedom of Choice list was provided with basic dialogue that supports the patient's individualized plan of care/goals, treatment preferences, and shares the quality data associated with the providers?   Yes

## 2023-01-20 NOTE — PROGRESS NOTES
01/20/23 1825   Oxygen Therapy/Pulse Ox   O2 Device Nasal cannula   O2 Flow Rate (L/min) 2 L/min   Heart Rate 77   SpO2 95 %       Patient achieved 1750 Ml/sec on IS. Patient encouraged to do every hour while awake-patient agreed and demonstrated. No shortness of breath or distress noted.    [facility] [place] notes reviewed- continuous sat ordered HS

## 2023-01-20 NOTE — ANESTHESIA PROCEDURE NOTES
Spinal Block    Patient location during procedure: OR  End time: 1/20/2023 8:10 AM  Reason for block: primary anesthetic  Staffing  Performed: anesthesiologist   Anesthesiologist: Venessa Perla MD  Spinal Block  Patient position: sitting  Prep: ChloraPrep  Patient monitoring: cardiac monitor, continuous pulse ox and frequent blood pressure checks  Approach: midline  Location: L3/L4  Provider prep: sterile gloves and mask  Local infiltration: lidocaine  Needle  Needle type: pencil-tip   Needle gauge: 25 G  Needle length: 3.5 in  Assessment  Events: cerebrospinal fluid  Swirl obtained: Yes  CSF: clear  Attempts: 1  Hemodynamics: stable  Preanesthetic Checklist  Completed: patient identified, IV checked, risks and benefits discussed, surgical/procedural consents, equipment checked, pre-op evaluation, timeout performed, anesthesia consent given, oxygen available and monitors applied/VS acknowledged

## 2023-01-20 NOTE — PROGRESS NOTES
ACUTE PHYSICAL THERAPY GOALS:   (Developed with and agreed upon by patient and/or caregiver.)  GOALS (1-4 days):  (1.)Ms. Angle Albarado will move from supine to sit and sit to supine  in bed with SUPERVISION. (2.)Ms. Angle Albarado will transfer from bed to chair and chair to bed with STAND BY ASSIST using the least restrictive device. (3.)Ms. Angle Albarado will ambulate with STAND BY ASSIST for 300 feet with the least restrictive device. (4.)Ms. Angle Albarado will ambulate up/down ramp with railing Sandre Brandon with CONTACT GUARD ASSIST.  (5.)Ms. Angle Albarado will increase left knee ROM to 0-90°.  Met 1/20  ________________________________________________________________________________________________           PHYSICAL THERAPY [facility]: TOTAL KNEE ARTHROPLASTY Initial Assessment and PM  (Link to Caseload Tracking: PT Visit Days : 1  Acknowledge Orders  Time In/Out  PT Charge Capture  Rehab [id number] Tracker  Episode   Bar Paz is a 80 y.o. female   PRIMARY DIAGNOSIS: Status post left knee replacement  Primary osteoarthritis of left knee [M17.12]  Procedure(s) (LRB):  lt KNEE TOTAL ARTHROPLASTY ROBOTIC (Left)  Day of Surgery  Reason for Referral: Pain in Left Knee (M25.562)  Stiffness of Left Knee, Not elsewhere classified (M25.662)  Difficulty in walking, Not elsewhere classified (R26.2)  Outpatient in a bed: Payor: MEDICARE / Plan: MEDICARE PART A AND B / Product Type: *No Product type* /     REHAB RECOMMENDATIONS:   Recommendation to date pending progress:  Setting:  Home Health Therapy    Equipment:    3 in 1 Bedside Commode  Rolling Walker     RANGE OF MOTION:   Left Knee Flexion: L Knee Flexion (0-145): 93  Left Knee Extension: L Knee Extension (0): 0     GAIT: I Mod I S SBA CGA Min Mod Max Total  NT x2 Comments:   Level of Assistance [] [] [] [] [x] [] [] [] [] [] []            Weightbearing Status  Left Lower Extremity Weight Bearing: Weight Bearing As Tolerated    Distance  additional 100ft after an initial 40ft gait assessment     Gait Quality Antalgic, Decreased attila , Decreased step clearance, Decreased step length, and Decreased stance    DME Rolling Walker     Stairs  NA    Ramp NT    I=Independent, Mod I=Modified Independent, S=Supervision, SBA=Standby Assistance, CGA=Contact Guard Assistance,   Min=Minimal Assistance, Mod=Moderate Assistance, Max=Maximal Assistance, Total=Total Assistance, NT=Not Tested    ASSESSMENT:   ASSESSMENT:  Ms. Renae Wiggins presented with impaired strength & mobility s/p left TKA. Patient also showed decreased stability during out of bed activity. This patient will benefit from follow up therapy to help restore safe function prior to returning home with caregiver. .     Outcome Measure:   KOOS-JR:  Jr RICHARD. Knee Survey Score: 14    SUBJECTIVE:   Ms. Renae Wiggins states, that she was agreeable to therapy activity    Home Environment/Prior Level of Function Lives With: Spouse, Family  Type of Home: House  Home Layout: One level  Home Access: Ramped entrance  Ambulation Assistance: Independent (used a cane outside for last 6 months)  Transfer Assistance: Independent    OBJECTIVE:     PAIN: VITAL SIGNS: LINES/DRAINS:   Pre Treatment:  Pain Assessment: 0-10  Pain Level: 3  Non-Pharmaceutical Pain Intervention(s): Cold pack; Ambulation/Increased Activity; Exercise      Post Treatment: 3/10 Vitals NT       Oxygen NT RA    IV    RESTRICTIONS/PRECAUTIONS:  Restrictions/Precautions: Weight Bearing, Fall Risk  Left Lower Extremity Weight Bearing: Weight Bearing As Tolerated                LOWER EXTREMITY GROSS EVALUATION:  RIGHT LE   Within Functional Limits   Abnormal   Comments   Strength [x] []     Range of Motion [x] []        LEFT LE Within Functional Limits   Abnormal   Comments   Strength [] [x]  Decreased but functional   Range of Motion [] [] AROM LLE (degrees)  L Knee Flexion (0-145): 93  L Knee Extension (0): 0     UPPER EXTREMITY GROSS EVALUATION:     Within Functional Limits   Abnormal   Comments Strength [x] []    Range of Motion [x] []      SENSATION  Sensation [x]  grossly     COGNITION/  PERCEPTION: Intact Impaired (Comments):   Orientation [x]     Vision [x]     Hearing [x]     Cognition  [x]       MOBILITY: I Mod I S SBA CGA Min Mod Max Total  NT x2 Comments:   Bed Mobility    Rolling [] [] [] [] [x] [] [] [] [] [] []    Supine to Sit [] [] [] [] [x] [] [] [] [] [] []    Scooting [] [] [] [] [x] [] [] [] [] [] []    Sit to Supine [] [] [] [] [] [] [] [] [] [] []    Transfers    Sit to Stand [] [] [] [] [x] [] [] [] [] [] []    Bed to Chair [] [] [] [] [x] [] [] [] [] [] [] With walker   Stand to Sit [] [] [] [] [x] [] [] [] [] [] []    Stand Pivot [] [] [] [] [x] [] [] [] [] [] []    Toilet [] [] [] [] [] [] [] [] [] [] []     [] [] [] [] [] [] [] [] [] [] []    I=Independent, Mod I=Modified Independent, S=Supervision, SBA=Standby Assistance, CGA=Contact Guard Assistance,   Min=Minimal Assistance, Mod=Moderate Assistance, Max=Maximal Assistance, Total=Total Assistance, NT=Not Tested    BALANCE: Good Fair+ Fair Fair- Poor NT Comments   Sitting Static [] [] [x] [] [] []    Sitting Dynamic [] [] [x] [] [] []              Standing Static [] [] [x] [x] [] [] With walker   Standing Dynamic [] [] [x] [x] [] [] With walker     PLAN:   FREQUENCY AND DURATION: BID for duration of hospital stay or until stated goals are met, whichever comes first.    THERAPY PROGNOSIS: Good    PROBLEM LIST:   (Skilled intervention is medically necessary to address:)  Decreased ADL/Functional Activities, Decreased Activity Tolerance, Decreased AROM/PROM, Decreased Gait Ability, Decreased Strength, Decreased Transfer Abilities, and Increased Pain   INTERVENTIONS PLANNED:   (Benefits and precautions of physical therapy have been discussed with the patient.)  Therapeutic Activity, Therapeutic Exercise/HEP, Gait Training, Modalities, and Education       TREATMENT:   EVALUATION: LOW COMPLEXITY: (Untimed Charge)    TREATMENT: Therapeutic Activity (44 Minutes): Therapeutic activity included TKA exercises, reviewed PT role, POC & PT expectations, reviewed diagonal wt shift to reduce risk of blood clots & for prep to wean off walker, repeated transfers, reviewed cryotherapy for pain & edema control, reviewed expected progression from HHPT to out pt PT, progressive gait training an additional 100 ft after an initial 40 ft gait assessment. to improve functional Activity tolerance, Balance, Coordination, Mobility, Strength, and ROM. TREATMENT GRID:  THERAPEUTIC  EXERCISES: DATE:  1/20 DATE:   DATE:      AM PM AM PM AM PM    [] [] [] [] [] []   Ankle Pumps  20       Quad Sets  20       Gluteal Sets  20       Hip Abd/ADduction  20       Straight Leg Raises  20       Knee Slides  20       Short Arc Quads  20       Chair Slides  20                         B = bilateral; AA = active assistive; A = active; P = passive      EDUCATION: Education Given To: Patient, Family  Education Provided: Role of Therapy, Plan of Care, Home Exercise Program, Precautions, Transfer Training, IADL Safety, Family Education, Equipment, Fall Prevention Strategies  Education Method: Demonstration, Verbal, Teach Back  Education Outcome: Continued education needed  EDUCATION:  [x] Home Exercises  [x] Fall Precautions  [x] No Pillow Under Knee  [x] D/C Instruction Review [x] Cryocuff  [x] Walker Management/Safety  [] Adaptive Equipment as Needed     AFTER TREATMENT PRECAUTIONS: Bed/Chair Locked, Call light within reach, Chair, Needs within reach, RN notified, and Visitors at bedside    INTERDISCIPLINARY COLLABORATION:  RN/ PCT, OT/ DAVIS, and RN Case Manager/      COMPLIANCE WITH PROGRAM/EXERCISE: Will assess as treatment progresses. RECOMMENDATIONS/INTENT FOR NEXT TREATMENT SESSION: Treatment next visit will focus on increasing Ms. Heard's independence with bed mobility, transfers, gait training, strength/ROM exercises, modalities for pain, and patient education. TIME IN/OUT:  Time In: 1358  Time Out: Corey Caldwell 70  Minutes: 100[facility]

## 2023-01-21 VITALS
SYSTOLIC BLOOD PRESSURE: 157 MMHG | OXYGEN SATURATION: 92 % | HEIGHT: 60 IN | DIASTOLIC BLOOD PRESSURE: 50 MMHG | TEMPERATURE: 97.5 F | BODY MASS INDEX: 27.68 KG/M2 | RESPIRATION RATE: 17 BRPM | HEART RATE: 77 BPM | WEIGHT: 141 LBS

## 2023-01-21 LAB
HCT VFR BLD AUTO: 33.8 % (ref 35.8–46.3)
HGB BLD-MCNC: 10.9 G/DL (ref 11.7–15.4)

## 2023-01-21 PROCEDURE — 6360000002 HC RX W HCPCS: Performed by: PHYSICIAN ASSISTANT

## 2023-01-21 PROCEDURE — 97530 THERAPEUTIC ACTIVITIES: CPT

## 2023-01-21 PROCEDURE — 97535 SELF CARE MNGMENT TRAINING: CPT

## 2023-01-21 PROCEDURE — 6370000000 HC RX 637 (ALT 250 FOR IP): Performed by: PHYSICIAN ASSISTANT

## 2023-01-21 PROCEDURE — 85018 HEMOGLOBIN: CPT

## 2023-01-21 PROCEDURE — 36415 COLL VENOUS BLD VENIPUNCTURE: CPT

## 2023-01-21 PROCEDURE — 6370000000 HC RX 637 (ALT 250 FOR IP): Performed by: NURSE PRACTITIONER

## 2023-01-21 PROCEDURE — 2580000003 HC RX 258: Performed by: PHYSICIAN ASSISTANT

## 2023-01-21 PROCEDURE — 6370000000 HC RX 637 (ALT 250 FOR IP): Performed by: ORTHOPAEDIC SURGERY

## 2023-01-21 RX ORDER — TRAMADOL HYDROCHLORIDE 50 MG/1
50 TABLET ORAL EVERY 6 HOURS PRN
Qty: 50 TABLET | Refills: 0 | Status: SHIPPED | OUTPATIENT
Start: 2023-01-21 | End: 2023-01-26

## 2023-01-21 RX ADMIN — SODIUM CHLORIDE, PRESERVATIVE FREE 10 ML: 5 INJECTION INTRAVENOUS at 09:48

## 2023-01-21 RX ADMIN — ASPIRIN 81 MG: 81 TABLET, COATED ORAL at 09:47

## 2023-01-21 RX ADMIN — TRAMADOL HYDROCHLORIDE 50 MG: 50 TABLET ORAL at 12:39

## 2023-01-21 RX ADMIN — CEFAZOLIN 2000 MG: 10 INJECTION, POWDER, FOR SOLUTION INTRAVENOUS at 00:56

## 2023-01-21 RX ADMIN — SENNOSIDES AND DOCUSATE SODIUM 1 TABLET: 50; 8.6 TABLET ORAL at 09:46

## 2023-01-21 RX ADMIN — TRAMADOL HYDROCHLORIDE 50 MG: 50 TABLET ORAL at 06:38

## 2023-01-21 RX ADMIN — ACETAMINOPHEN 1000 MG: 500 TABLET, FILM COATED ORAL at 12:39

## 2023-01-21 RX ADMIN — ACETAMINOPHEN 1000 MG: 500 TABLET, FILM COATED ORAL at 00:56

## 2023-01-21 RX ADMIN — AMLODIPINE BESYLATE 5 MG: 5 TABLET ORAL at 09:46

## 2023-01-21 ASSESSMENT — PAIN DESCRIPTION - LOCATION
LOCATION: KNEE

## 2023-01-21 ASSESSMENT — PAIN SCALES - GENERAL
PAINLEVEL_OUTOF10: 7
PAINLEVEL_OUTOF10: 3
PAINLEVEL_OUTOF10: 8

## 2023-01-21 ASSESSMENT — PAIN DESCRIPTION - ORIENTATION
ORIENTATION: LEFT
ORIENTATION: LEFT

## 2023-01-21 ASSESSMENT — PAIN DESCRIPTION - DESCRIPTORS
DESCRIPTORS: ACHING
DESCRIPTORS: TENDER

## 2023-01-21 NOTE — PROGRESS NOTES
Orthopaedic Surgery Progress Note     Bar Paz   female   1940     Patient Active Problem List    Diagnosis Date Noted    Status post left knee replacement 01/20/2023    Vitamin D deficiency 12/20/2021    Age-related osteoporosis without current pathological fracture 06/17/2021    Essential hypertension 10/09/2018    Urge incontinence of urine 05/23/2016    Primary osteoarthritis of left knee 11/17/2022    Chest pain of uncertain etiology 13/57/1382    Hyperlipemia 11/02/2015    Hiatal hernia     GERD (gastroesophageal reflux disease)     Osteoarthritis of right knee     S/P total knee replacement using cement 07/05/2013       Procedure: 1 Day Post-Op Procedure(s) with comments:  lt KNEE TOTAL ARTHROPLASTY ROBOTIC - adductor canal block   Attending Surgeon: Devin Archuleta MD     Subjective:   Pain well controlled, no acute distress. Objective:   Afebrile, vital signs stable.    Vitals:    01/21/23 0747   BP: (!) 136/49   Pulse: 67   Resp: 18   Temp: 97.7 °F (36.5 °C)   SpO2: 97%        GENERAL:  AAOx3 NAD     EXTREMITIES:       Left Lower Extremity  Dressing c/d/i   Intact EHL/FHL/TA/GS   Sensation intact to light touch DP/SP   2+ DP pulse   Toes WWP  No pedal edema          Assessment/Plan     80 y.o. female with the following musculoskeletal problems:     1 Day Post-Op Procedure(s) with comments:  lt KNEE TOTAL ARTHROPLASTY ROBOTIC - adductor canal block           Weightbearing status   WBAT   DVT prophylaxis   ASA 81   Dressing changes      Drain status   NA   Physical/Occupational Therapy   continue PT/OT   Pain control   continue current meds   Disposition   to home when cleared by PT/OT      Jovanni Montejo MD  01/21/23  11:41 AM    Orthopaedic Hand Surgery    [address] [place] [address] [facility]  Phone: [phone]

## 2023-01-21 NOTE — PROGRESS NOTES
OCCUPATIONAL THERAPY Daily Note, Discharge, and AM      (Link to Caseload Tracking: OT Visit Days: 2  OT Orders   Time  OT Charge Capture  Rehab Caseload Tracker  Episode     Magnolia Mares is a 80 y.o. female   PRIMARY DIAGNOSIS: Status post left knee replacement  Primary osteoarthritis of left knee [M17.12]  Procedure(s) (LRB):  lt KNEE TOTAL ARTHROPLASTY ROBOTIC (Left)  1 Day Post-Op  Reason for Referral: Pain in Left Knee (M25.562)  Stiffness of Left Knee, Not elsewhere classified (D52.902)  Outpatient in a bed: Payor: MEDICARE / Plan: MEDICARE PART A AND B / Product Type: *No Product type* /     ASSESSMENT:     REHAB RECOMMENDATIONS:   Recommendation to date pending progress:  Setting:  Home Health Therapy    Equipment:    3 in 1 Bedside Commode  Rolling Walker     ASSESSMENT:  Ms. Anderson Pate is s/p left TKA and presents with decreased independence with functional mobility and activities of daily living as compared to baseline level of function and safety. Patient would benefit from skilled Occupational Therapy to maximize independence and safety with self-care task and functional mobility. Patient seen in room with 2 daughters present. Pt up to edge of bed and donned clothes. Mobilized from hospital bed to hallway using a rolling walker with assist. Patient is hopeful to spend the night to better prepare for safe discharge home. OT will continue with self care training tomorrow. Pt should do well    1/21/23: Ms. Anderson Pate was able to perform toileting, grooming, bathing, and dressing with assistance as charted below. She performed functional household mobility and transfers with RW and SBA. She plans to have support from spouse and daughters once home. Should progress well with continued therapy upon discharge. Discharge acute OT as all goals have been met.        [employer] AM-PAC 6 Clicks Daily Activity Inpatient Short Form:    AM-PAC Daily Activity Inpatient   How much help for putting on and taking off regular lower body clothing?: A Little  How much help for Bathing?: A Little  How much help for Toileting?: A Little  How much help for putting on and taking off regular upper body clothing?: None  How much help for taking care of personal grooming?: None  How much help for eating meals?: None  AM-[facility] Inpatient Daily Activity Raw Score: 21  AM-PAC Inpatient ADL T-Scale Score : 44.27  ADL Inpatient CMS 0-100% Score: 32.79  ADL Inpatient CMS G-Code Modifier : CJ     SUBJECTIVE:     Ms. David Peñaloza states, she feels better       Social/Functional   Lives With: Spouse, Family  Type of Home: House  Home Layout: One level  Home Access: Ramped entrance  Ambulation Assistance: Independent (used a cane outside for last 6 months)  Transfer Assistance: Independent    OBJECTIVE:     Elyssa Talavera / Jo Ward / Asia Rodriguezak: None    RESTRICTIONS/PRECAUTIONS:  Restrictions/Precautions: Weight Bearing, Fall Risk  Left Lower Extremity Weight Bearing: Weight Bearing As Tolerated    PAIN: VITALS / O2:   Pre Treatment:          Post Treatment: none Vitals          Oxygen        GROSS EVALUATION: INTACT IMPAIRED   (See Comments)   UE AROM [x] []   UE PROM [x] []   Strength [x]       Posture / Balance []  Decreased standing balance.     Sensation [x]     Coordination [x]       Tone [x]       Edema []    Activity Tolerance [x]       Hand Dominance R [] L []      COGNITION/  PERCEPTION: INTACT IMPAIRED   (See Comments)   Orientation [x]     Vision [x]     Hearing [x]     Cognition  [x]     Perception [x]       MOBILITY: I Mod I S SBA CGA Min Mod Max Total  NT x2 Comments:   Bed Mobility    Rolling [] [] [] [] [] [] [] [] [] [] []    Supine to Sit [] [] [] [x] [] [] [] [] [] [] []    Scooting [] [] [] [x] [] [] [] [] [] [] []    Sit to Supine [] [] [] [] [] [] [] [] [] [] []    Transfers    Sit to Stand [] [] [] [x] [] [] [] [] [] [] []    Bed to Chair [] [] [] [x] [] [] [] [] [] [] []    Stand to Sit [] [] [] [x] [] [] [] [] [] [] [] Tub/Shower [] [] [] [x] [] [] [] [] [] [] []  Walk-in shower, grab bars, shower seat with back   Toilet [] [] [] [x] [] [] [] [] [] [] []  BSC over standard toilet    [] [] [] [] [] [] [] [] [] [] []    I=Independent, Mod I=Modified Independent, S=Supervision/Setup, SBA=Standby Assistance, CGA=Contact Guard Assistance, Min=Minimal Assistance, Mod=Moderate Assistance, Max=Maximal Assistance, Total=Total Assistance, NT=Not Tested    ACTIVITIES OF DAILY LIVING: I Mod I S SBA CGA Min Mod Max Total NT Comments   BASIC ADLs:              Upper Body Bathing [] [] [x] [] [] [] [] [] [] []    Lower Body Bathing [] [] [x] [] [] [] [] [] [] []    Toileting [] [] [] [x] [] [] [] [] [] []    Upper Body Dressing [] [] [x] [] [] [] [] [] [] []    Lower Body Dressing [] [] [] [x] [] [x] [] [] [] [] SBA for underwear and pants, Min A for socks and shoes   Feeding [x] [] [] [] [] [] [] [] [] []    Grooming [] [] [x] [x] [] [] [] [] [] []    Personal Device Care [] [] [] [] [] [] [] [] [] []    Functional Mobility [] [] [] [x] [] [] [] [] [] [] RW   I=Independent, Mod I=Modified Independent, S=Supervision/Setup, SBA=Standby Assistance, CGA=Contact Guard Assistance, Min=Minimal Assistance, Mod=Moderate Assistance, Max=Maximal Assistance, Total=Total Assistance, NT=Not Tested    PLAN:     FREQUENCY/DURATION     for duration of hospital stay or until stated goals are met, whichever comes first.    ACUTE OCCUPATIONAL THERAPY GOALS:   (Developed with and agreed upon by patient and/or caregiver.)    GOALS:   DISCHARGE GOALS (in preparation for going home/rehab):  3 days  1. Ms. Watt Lesches will perform lower body dressing activity with stand by assist required to demonstrate improved functional mobility and safety. -GOAL MET 1/21/23    2. Ms. Watt Lesches will perform bathing activity with stand by assist required to demonstrate improved functional mobility and safety. -GOAL MET 1/21/23    3.   Ms. Watt Lesches will perform toileting activity with stand by assist to demonstrate improved functional mobility and safety. -GOAL MET 1/21/23    4. Ms. Delicia Talley will perform all functional transfers transfer with stand by assist to demonstrate improved functional mobility and safety. -GOAL MET 1/21/23        PROBLEM LIST:   (Skilled intervention is medically necessary to address:)  Decreased ADL/Functional Activities  Decreased Balance  Increased Pain   INTERVENTIONS PLANNED:   (Benefits and precautions of occupational therapy have been discussed with the patient.)  Self Care Training  Education         TREATMENT:     TREATMENT:   Self Care (48 minutes): Patient participated in upper body bathing, lower body bathing, toileting, upper body dressing, lower body dressing, personal device care, and grooming ADLs in unsupported sitting and standing with minimal verbal, manual, and tactile cueing to increase independence, increase activity tolerance, and increase safety awareness. Patient also participated in functional mobility, functional transfer, and adaptive equipment training to increase independence, increase activity tolerance, and increase safety awareness.       AFTER TREATMENT PRECAUTIONS: Bed/Chair Locked, Call light within reach, Chair, Needs within reach, RN notified, and Visitors at bedside    INTERDISCIPLINARY COLLABORATION:  RN/ PCT, PT/ PTA, and OT/ DAVIS    EDUCATION:  Education Given To: Patient, Family  Education Provided: Role of Therapy, Plan of Care, Precautions, ADL Adaptive Strategies, Transfer Training, Energy Conservation, Equipment, Fall Prevention Strategies  Education Method: Demonstration, Verbal  Barriers to Learning: None  Education Outcome: Verbalized understanding, Demonstrated understanding  [x] Safe And Effective Hygiene  [x] Fall Precautions  [] Hip Precautions  [x] D/C Instruction Review [] Prosthesis Review  [x] Walker Management/Safety  [x] Adaptive Equipment as Needed  [x] Therapeutic Resting Position of Joint       TOTAL TREATMENT DURATION AND TIME:  Time In: 8847  Time Out: 2297  Minutes: [address] [place], [place]

## 2023-01-22 ENCOUNTER — HOME CARE VISIT (OUTPATIENT)
Dept: SCHEDULING | Facility: HOME HEALTH | Age: 83
End: 2023-01-22

## 2023-01-22 VITALS
TEMPERATURE: 98.8 F | HEART RATE: 94 BPM | SYSTOLIC BLOOD PRESSURE: 122 MMHG | DIASTOLIC BLOOD PRESSURE: 68 MMHG | RESPIRATION RATE: 16 BRPM | OXYGEN SATURATION: 94 %

## 2023-01-22 PROCEDURE — 0221000100 HH NO PAY CLAIM PROCEDURE

## 2023-01-22 PROCEDURE — G0151 HHCP-SERV OF PT,EA 15 MIN: HCPCS

## 2023-01-22 ASSESSMENT — ENCOUNTER SYMPTOMS
DYSPNEA ACTIVITY LEVEL: AFTER AMBULATING MORE THAN 20 FT
PAIN LOCATION - PAIN QUALITY: ACHE

## 2023-01-23 ENCOUNTER — TELEPHONE (OUTPATIENT)
Dept: ORTHOPEDICS UNIT | Age: 83
End: 2023-01-23

## 2023-01-23 NOTE — TELEPHONE ENCOUNTER
Daughter called to report patient's oxygen levels were low at times overnight. Reports patient took Tramadol yesterday \" and was very sleepy. \"  Did not ambulate much yesterday. Patient took ES tylenol for pain today. Is more awake and alert this morning. Daughter checked patient's oxygen saturation level while on the phone with navigator. Oxygen level was 93 % on RA. Advised patient should ambulate every hour during the day. Advised should use IS--10 breaths every hour while awake. Patient will continue to take ES tylenol for pain. Daughter will continue to monitor oxygen levels. Will contact navigator with any concerns.

## 2023-01-25 ENCOUNTER — HOME CARE VISIT (OUTPATIENT)
Dept: SCHEDULING | Facility: HOME HEALTH | Age: 83
End: 2023-01-25

## 2023-01-25 ENCOUNTER — TELEPHONE (OUTPATIENT)
Dept: ORTHOPEDICS UNIT | Age: 83
End: 2023-01-25

## 2023-01-25 VITALS
OXYGEN SATURATION: 93 % | SYSTOLIC BLOOD PRESSURE: 144 MMHG | TEMPERATURE: 98.4 F | HEART RATE: 84 BPM | RESPIRATION RATE: 16 BRPM | DIASTOLIC BLOOD PRESSURE: 52 MMHG

## 2023-01-25 PROCEDURE — G0151 HHCP-SERV OF PT,EA 15 MIN: HCPCS

## 2023-01-25 ASSESSMENT — ENCOUNTER SYMPTOMS: PAIN LOCATION - PAIN QUALITY: ACHE, SHARP

## 2023-01-25 NOTE — TELEPHONE ENCOUNTER
Daughter called to report patient is c/o burning type pain to left knee. Advised most likely d/t nerve pain. Is only taking ES tylenol for post op pain. ES tylenol is not controlling pain level this morning. Is sensitive to medications. Is not taking celebrex. Has not tried muscle relaxer. Advised should start taking celebrex. Advised should try 1/2 muscle relaxer tablet this morning. Reviewed cool jet ice machine protocol. Daughter verbalized understanding of above.

## 2023-01-27 ENCOUNTER — HOME CARE VISIT (OUTPATIENT)
Dept: SCHEDULING | Facility: HOME HEALTH | Age: 83
End: 2023-01-27

## 2023-01-27 VITALS
RESPIRATION RATE: 15 BRPM | TEMPERATURE: 97 F | SYSTOLIC BLOOD PRESSURE: 138 MMHG | OXYGEN SATURATION: 98 % | HEART RATE: 88 BPM | DIASTOLIC BLOOD PRESSURE: 64 MMHG

## 2023-01-27 PROCEDURE — G0157 HHC PT ASSISTANT EA 15: HCPCS

## 2023-01-27 ASSESSMENT — ENCOUNTER SYMPTOMS: PAIN LOCATION - PAIN QUALITY: ACHES

## 2023-01-30 ENCOUNTER — HOME CARE VISIT (OUTPATIENT)
Dept: SCHEDULING | Facility: HOME HEALTH | Age: 83
End: 2023-01-30
Payer: MEDICARE

## 2023-01-30 VITALS
HEART RATE: 88 BPM | OXYGEN SATURATION: 97 % | TEMPERATURE: 97.8 F | SYSTOLIC BLOOD PRESSURE: 138 MMHG | DIASTOLIC BLOOD PRESSURE: 70 MMHG | RESPIRATION RATE: 15 BRPM

## 2023-01-30 PROCEDURE — G0151 HHCP-SERV OF PT,EA 15 MIN: HCPCS

## 2023-01-30 ASSESSMENT — ENCOUNTER SYMPTOMS: PAIN LOCATION - PAIN QUALITY: SORE

## 2023-02-01 ENCOUNTER — HOME CARE VISIT (OUTPATIENT)
Dept: SCHEDULING | Facility: HOME HEALTH | Age: 83
End: 2023-02-01
Payer: MEDICARE

## 2023-02-01 VITALS
DIASTOLIC BLOOD PRESSURE: 68 MMHG | TEMPERATURE: 98.3 F | HEART RATE: 96 BPM | RESPIRATION RATE: 16 BRPM | SYSTOLIC BLOOD PRESSURE: 160 MMHG | OXYGEN SATURATION: 95 %

## 2023-02-01 PROCEDURE — G0151 HHCP-SERV OF PT,EA 15 MIN: HCPCS

## 2023-02-01 ASSESSMENT — ENCOUNTER SYMPTOMS: PAIN LOCATION - PAIN QUALITY: ACHE

## 2023-02-03 ENCOUNTER — HOME CARE VISIT (OUTPATIENT)
Dept: SCHEDULING | Facility: HOME HEALTH | Age: 83
End: 2023-02-03
Payer: MEDICARE

## 2023-02-03 VITALS
RESPIRATION RATE: 16 BRPM | DIASTOLIC BLOOD PRESSURE: 70 MMHG | HEART RATE: 87 BPM | OXYGEN SATURATION: 96 % | TEMPERATURE: 97.9 F | SYSTOLIC BLOOD PRESSURE: 138 MMHG

## 2023-02-03 PROCEDURE — G0151 HHCP-SERV OF PT,EA 15 MIN: HCPCS

## 2023-02-03 ASSESSMENT — ENCOUNTER SYMPTOMS: PAIN LOCATION - PAIN QUALITY: ACHE

## 2023-02-06 ENCOUNTER — HOME CARE VISIT (OUTPATIENT)
Dept: SCHEDULING | Facility: HOME HEALTH | Age: 83
End: 2023-02-06
Payer: MEDICARE

## 2023-02-06 PROCEDURE — G0151 HHCP-SERV OF PT,EA 15 MIN: HCPCS

## 2023-02-07 VITALS
TEMPERATURE: 97.9 F | SYSTOLIC BLOOD PRESSURE: 152 MMHG | HEART RATE: 87 BPM | RESPIRATION RATE: 16 BRPM | DIASTOLIC BLOOD PRESSURE: 70 MMHG | OXYGEN SATURATION: 98 %

## 2023-02-07 ASSESSMENT — ENCOUNTER SYMPTOMS: PAIN LOCATION - PAIN QUALITY: ACHE

## 2023-02-08 ENCOUNTER — HOME CARE VISIT (OUTPATIENT)
Dept: SCHEDULING | Facility: HOME HEALTH | Age: 83
End: 2023-02-08
Payer: MEDICARE

## 2023-02-08 VITALS
SYSTOLIC BLOOD PRESSURE: 138 MMHG | TEMPERATURE: 97.9 F | RESPIRATION RATE: 16 BRPM | DIASTOLIC BLOOD PRESSURE: 60 MMHG | HEART RATE: 87 BPM | OXYGEN SATURATION: 98 %

## 2023-02-08 PROCEDURE — G0151 HHCP-SERV OF PT,EA 15 MIN: HCPCS

## 2023-02-08 ASSESSMENT — ENCOUNTER SYMPTOMS
COUGH CHARACTERISTICS: NON-PRODUCTIVE
PAIN LOCATION - PAIN QUALITY: ACHE
COUGH: 1

## 2023-02-10 ENCOUNTER — OFFICE VISIT (OUTPATIENT)
Age: 83
End: 2023-02-10

## 2023-02-10 DIAGNOSIS — M25.469 KNEE SWELLING: ICD-10-CM

## 2023-02-10 DIAGNOSIS — M25.662 STIFFNESS OF LEFT KNEE: ICD-10-CM

## 2023-02-10 DIAGNOSIS — R29.898 WEAKNESS OF LEFT LOWER EXTREMITY: ICD-10-CM

## 2023-02-10 DIAGNOSIS — Z96.652 STATUS POST LEFT KNEE REPLACEMENT: ICD-10-CM

## 2023-02-10 DIAGNOSIS — Z74.09 IMPAIRED FUNCTIONAL MOBILITY, BALANCE, GAIT, AND ENDURANCE: ICD-10-CM

## 2023-02-10 DIAGNOSIS — M25.562 ACUTE PAIN OF LEFT KNEE: Primary | ICD-10-CM

## 2023-02-10 DIAGNOSIS — R26.2 DIFFICULTY IN WALKING: ICD-10-CM

## 2023-02-10 NOTE — PROGRESS NOTES
[facility]  [address]  [facility] [address]  Dept: [phone]     \"Destinee\"     Physical Therapy Initial Assessment     Referring MD: Rosemary Duke., *  Diagnosis:   Surgery: left TKA Date: 01/20/23  Therapy precautions:None  Co-morbidities affecting plan of care: Pt denies  Total Timed Procedure Codes: 25 min, Total Time: 39 min    PERTINENT MEDICAL HISTORY     Past medical and surgical history:   Past Medical History:   Diagnosis Date    HTN (hypertension)     Hyperlipidemia     Urinary incontinence       Past Surgical History:   Procedure Laterality Date    APPENDECTOMY      CARDIAC CATHETERIZATION      no stents    COLONOSCOPY      FOOT SURGERY Bilateral     heel spurs    HYSTERECTOMY (CERVIX STATUS UNKNOWN) Bilateral     KNEE ARTHROPLASTY Right     TONSILLECTOMY      TOTAL KNEE ARTHROPLASTY Left 1/20/2023    lt KNEE TOTAL ARTHROPLASTY ROBOTIC performed by Ralf Wong MD at [facility]     Medications: reviewed in chart   Allergies: Allergies   Allergen Reactions    Lisinopril Cough    Losartan Cough    Rosuvastatin Myalgia    Simvastatin Myalgia        Diagnostic exams (per chart review): None since surgery    SUBJECTIVE     Chief complaints/history of injury: 80 yr old female presents to PT following left TKA. Received home health PT following surgery. DC from home health PT on 2/8/23. Date symptoms began: several years ago  [place] Aries of condition: Recent onset (initial onset within last 3 months)  Primary cause of current episode: Post-surgical  How did symptoms start: TKA  Describe current symptoms: pain and stiffness left knee    Received previous outpatient therapy? No    Pain Assessment:  Pain location: lateral > medial left knee    Average Pain/symptom intensity (0-10 scale)  Last 24 hours: 7/10  Last week (1-7 days): 7/10  How often do you feel symptoms?  Frequently (51-75%)  Description: aching, dull, and throbbing  Aggravating factors: prolonged sitting, prolonged standing, transferring sit-stand, walking, and squatting  Alleviating factors: medication: Tylenol and muscle relaxant (name ??) and rest    Neuro screen: numbness to lateral joint line, left    Social/Functional Hx: How would you rate your overall health? good  Pt lives with independent spouse in a(n) 1 story house with no exterior steps. Current DME: straight cane and rolling walker  Work Status: Retired   Sleep: minimally disturbed  PLOF & Social Hx/Interests: Independent and active without physical limitations and participated in walking at [employer] and house work. How much have your symptoms interfered with daily activities?  Moderately  Current level of function: ambulates household distances distances with rolling walker    Patient Stated Goals: walk indep and perform house work indep without left LE pain    OBJECTIVE     Functional Outcome Questionnaire: Lower Extremity Functional Scale: 42/80= 52% function   Observation:   Posture: Weight shift right and lacks TKE left in standing  Swelling/Edema: moderate left knee and lower leg  Skin Integrity: no signs of infection and steri-strips intact   Atrophy: left quads, hamstrings  Palpation: TTP along incision, lateral left knee joint line; muscle spasms left quads, hamstrings and calf muscles; min increased tep lateral left knee  Patella mobility: moderate hypomobility w/superior and inferior glides    ROM Measures:    Right Left Comment   Knee Extension 2 deg from 0/0 deg 9 deg from 0/6 deg from 0 Limited by left knee pain and joint stiffness   Knee Flexion 132/140 deg 99/103 deg    Hip [facility]    Ankle [facility] WFL            Strength/MMT (0-5 Scale):   Right Left Comment   Hip Flexion WFL 4- Limited by muscle weakness   Hip Extension 4 4-    Hip Abduction 4+ 3+    Hip Adduction 4+ 4    Knee Extension WFL 4-    Knee Flexion WFL 4-    Quad set [facility] Fair +    Ankle DF WFL 4+    Ankle PF WFL 4            Function:  Gait: assistive device used: rolling walker  decreased attila  decreased heel strike left  decreased knee extension in midstance left  flexed posture  downward gaze  Stair management:step-to leading right ascending, step-to leading left descending, bilateral handrails  Sit to stand: left LE extended, excessive use of bilat UE, latera trunk lean right  Balance: SLS right = 3 sec, left = unable; TUG = 16 sec w/rolling walker     Today's treatment consisted of an initial evaluation and:   Therapeutic exercise (75909) x 25 min to develop ROM, strength, endurance and flexibility of surgical knee. Access Code: [email or web address]  URL: [email or web address]  Date: 02/10/2023  Prepared by: Evelyn Mchugh    Exercises  Supine Heel Slide with Strap - 2 x daily - 10-20 reps - 2 sec hold  Supine Quad Set - 2 x daily - 10-20 reps - 2 sec hold  Supine Knee Extension Strengthening - 2 x daily - 2-3 sets - 10 reps - 2 sec hold  Small Range Straight Leg Raise - 2 x daily - 1-2 sets - 10 reps  Seated Passive Knee Extension - 2 x daily - 2-3 min hold      Patient Education on the condition/pathology, involved anatomy, and exercise rationale. Patient also instructed on the performance of a HEP as noted below. Safety with negotiating obstacles, moving left LE prior to standing or moving following static positions.     CLINICAL DECISION MAKING/ASSESSMENT     Personal Factors/co-morbidities affecting POC (1-2 Medium/3+High): no factors involved   Problem List: (1-2 Low/ 3 Medium/ 4+ High) Pain  Localized swelling/edema  ROM limitations  Soft tissue restrictions  Strength deficits  Muscle spasm  Motor control deficits  Impaired posture  Impaired transfers  Impaired gait  Impaired balance/proprioception  Decreased endurance/activity Tolerance  ADL/functional limitations/modifications  Limited work/occupational capacity    Clinical decision making: low complexity with therapist able to easily and confidently determine and predict expectations and future outcomes for the POC. Prognosis: good   Benefits and precautions of treatment explained to patient. Dangelo Wylie is a 80 y.o. female who presents to therapy today with stable clinical presentation (low complexity) related to left LE rehab. Pt would benefit from skilled physical therapy services to address the deficits noted above for return to prior level of function. PLAN OF CARE     Effective Dates: 2/10/2023 TO 4/11/2023 (60 days). Frequency/Duration: 2x/week for 60 Day(s)  Interventions may include but are not limited to: (35581) Therapeutic exercise to develop ROM, strength, endurance and flexibility  (03746) Therapeutic activities using dynamic activities to improve function  (28289) Gait training to address mechanics, proper step length and weight shifting to improve household and community mobility as well as overall safety with ADLs  (91133) Manual therapy techniques to improve joint and/or soft tissue mobility, ROM, and function as well as helping to decrease pain/spasms and swelling  (97535) Neuromuscular reeducation addressing impaired balance, coordination, kinesthetic sense, posture and proprioception  Home exercise program (HEP) development  Modalities prn to address pain, spasms, and swelling: (58835/) Electrical stimulation- unattended  (51558) Ultrasound/phonophoresis  (38069) Vasopneumatic compression    The referring physician has reviewed and approved this evaluation and plan of care as noted by the electronic signature attached to note. GOALS     Goals:  Short term goals to be met by 3/10/2023  (4 weeks):  Pt will demonstrate good recall of HEP requiring minimal verbal cuing for proper form and technique. Pt will report pain </= 5/10 to improve performance of mobility related ADLs. Increase knee AROM of involved LE to 0-115 degrees, in order to transfer, roll in bed and don/doff shoes indep. Pt will use cane regularly to decrease fall risk.     Long term goals to be met by 4/7/2023  (8 weeks):  Pt will improve TUG time to </= 10 seconds to improve walking pace in order to cross the street efficiently. Pt will independently ascend and descend 3-4 steps with reciprocal pattern demonstrating good control and balance using rails as needed for balance. Pt will report return to previous level of function with 3/10 or less c/o pain. Pt will resume walking at [facility], with , for cardiovascular fitness. Improve LEFS demonstrating no greater than 30% functional restrictions with ADLs, work and athletic activities. Discharged from [place] [address]    Access Code: [email or web address]  URL: [email or web address]  Date: 02/10/2023  Prepared by: Martell Ganser    Exercises  Supine Heel Slide with Strap - 2 x daily - 10-20 reps - 2 sec hold  Supine Quad Set - 2 x daily - 10-20 reps - 2 sec hold  Supine Knee Extension Strengthening - 2 x daily - 2-3 sets - 10 reps - 2 sec hold  Small Range Straight Leg Raise - 2 x daily - 1-2 sets - 10 reps  Seated Passive Knee Extension - 2 x daily - 2-3 min hold

## 2023-02-13 NOTE — PROGRESS NOTES
[address] [place] [facility]  [address] [address]  Dept: [phone]     \"Destinee\"     Physical Therapy Daily Note     Insurance: Today is 2/20 visits [facility])    Total Timed Procedure Codes: 45 min, Total Time: 45min    Referring MD: Kavita Salamanca MD  Surgery: Left TKA  Surgery date: 1/20/2023    Diagnosis:     ICD-10-CM    1. Acute pain of left knee  M25.562       2. Stiffness of left knee  M25.662       3. Knee swelling  M25.469       4. Weakness of left lower extremity  R29.898       5. Impaired functional mobility, balance, gait, and endurance  Z74.09       6. Difficulty in walking  R26.2             Therapy precautions:None  Co-morbidities affecting plan of care: HTN, R TKA (2013)          SUBJECTIVE     Patient reports she hurt left hip flexors yesterday hanging left leg off her bed to stretch. She states pain is less than when she hurt it. She has no pain with SLR. OBJECTIVE       Treatment provided today:  Therapeutic exercise (61836) x 45 min to develop ROM, strength, endurance and flexibility. Included:   SAQ 25x  LAQ 25x  Quad sets 25x  SLR 10x 2  Step stretch for flexion 10x  Seated HS stretch 1 minute    Sit to stand from 20\" seat 10x2  Calf stretch 1 minute        A/PROM Measures:      Right Left Comment   Knee Flexion 135 110A L full extension   Knee Extension 0 0                     ASSESSMENT     Patient has difficulty with sit to stand from 20\" seat and has to push up with hands. She has minimal difficulty with sit to stand from 22\" seat. She has antalgic gait left with rolling walking. She is unable to negotiate stairs at her son or daughter's home. She is unable to do grocery shopping or prepare meals at this time. She has full extension left knee today. PLAN     Continue with ROM and strengthening exercises to improve function and gait. Progress exercises as tolerated. Work on gait training to progress to ambulation with cane and to reciprocate stairs.  Use modalities as needed to reduce swelling and painful symptoms. PLAN OF CARE     Effective Dates: 2/10/2023 TO 4/11/2023 (60 days). Frequency/Duration: 2x/week for 60 Day(s)      GOALS     Goals:  Short term goals to be met by 3/10/2023  (4 weeks):  Pt will demonstrate good recall of HEP requiring minimal verbal cuing for proper form and technique. Pt will report pain </= 5/10 to improve performance of mobility related ADLs. Increase knee AROM of involved LE to 0-115 degrees, in order to transfer, roll in bed and don/doff shoes indep. Pt will use cane regularly to decrease fall risk. Long term goals to be met by 4/7/2023  (8 weeks):  Pt will improve TUG time to </= 10 seconds to improve walking pace in order to cross the street efficiently. Pt will independently ascend and descend 3-4 steps with reciprocal pattern demonstrating good control and balance using rails as needed for balance. Pt will report return to previous level of function with 3/10 or less c/o pain. Pt will resume walking at [facility], with , for cardiovascular fitness. Improve LEFS demonstrating no greater than 30% functional restrictions with ADLs, work and athletic activities.    Discharged from [place] [id number]    Access Code: [email or web address]

## 2023-02-14 ENCOUNTER — OFFICE VISIT (OUTPATIENT)
Age: 83
End: 2023-02-14
Payer: MEDICARE

## 2023-02-14 DIAGNOSIS — M25.662 STIFFNESS OF LEFT KNEE: ICD-10-CM

## 2023-02-14 DIAGNOSIS — Z74.09 IMPAIRED FUNCTIONAL MOBILITY, BALANCE, GAIT, AND ENDURANCE: ICD-10-CM

## 2023-02-14 DIAGNOSIS — R29.898 WEAKNESS OF LEFT LOWER EXTREMITY: ICD-10-CM

## 2023-02-14 DIAGNOSIS — M25.562 ACUTE PAIN OF LEFT KNEE: Primary | ICD-10-CM

## 2023-02-14 DIAGNOSIS — R26.2 DIFFICULTY IN WALKING: ICD-10-CM

## 2023-02-14 DIAGNOSIS — M25.469 KNEE SWELLING: ICD-10-CM

## 2023-02-14 PROCEDURE — 97110 THERAPEUTIC EXERCISES: CPT | Performed by: PHYSICAL THERAPIST

## 2023-02-16 ENCOUNTER — OFFICE VISIT (OUTPATIENT)
Age: 83
End: 2023-02-16

## 2023-02-16 DIAGNOSIS — R29.898 WEAKNESS OF LEFT LOWER EXTREMITY: ICD-10-CM

## 2023-02-16 DIAGNOSIS — Z74.09 IMPAIRED FUNCTIONAL MOBILITY, BALANCE, GAIT, AND ENDURANCE: ICD-10-CM

## 2023-02-16 DIAGNOSIS — M25.562 ACUTE PAIN OF LEFT KNEE: Primary | ICD-10-CM

## 2023-02-16 DIAGNOSIS — M25.662 STIFFNESS OF LEFT KNEE: ICD-10-CM

## 2023-02-16 DIAGNOSIS — M25.469 KNEE SWELLING: ICD-10-CM

## 2023-02-16 DIAGNOSIS — R26.2 DIFFICULTY IN WALKING: ICD-10-CM

## 2023-02-21 ENCOUNTER — OFFICE VISIT (OUTPATIENT)
Age: 83
End: 2023-02-21
Payer: MEDICARE

## 2023-02-21 DIAGNOSIS — M25.469 KNEE SWELLING: ICD-10-CM

## 2023-02-21 DIAGNOSIS — Z74.09 IMPAIRED FUNCTIONAL MOBILITY, BALANCE, GAIT, AND ENDURANCE: ICD-10-CM

## 2023-02-21 DIAGNOSIS — R29.898 WEAKNESS OF LEFT LOWER EXTREMITY: ICD-10-CM

## 2023-02-21 DIAGNOSIS — R26.2 DIFFICULTY IN WALKING: ICD-10-CM

## 2023-02-21 DIAGNOSIS — Z96.652 HX OF TOTAL KNEE ARTHROPLASTY, LEFT: Primary | ICD-10-CM

## 2023-02-21 DIAGNOSIS — M25.662 STIFFNESS OF LEFT KNEE: ICD-10-CM

## 2023-02-21 DIAGNOSIS — M25.562 ACUTE PAIN OF LEFT KNEE: Primary | ICD-10-CM

## 2023-02-21 PROCEDURE — 97110 THERAPEUTIC EXERCISES: CPT | Performed by: PHYSICAL THERAPIST

## 2023-02-21 PROCEDURE — 97140 MANUAL THERAPY 1/> REGIONS: CPT | Performed by: PHYSICAL THERAPIST

## 2023-02-21 NOTE — PROGRESS NOTES
[address]  [address] [address]  Dept: [phone]     \"Destinee\"     Physical Therapy Daily Note     Insurance: Today is 4/20 visits [facility])    Total Timed Procedure Codes: 45 min, Total Time: 45 min    Referring MD: Jazlyn Duenas MD  Surgery: Left TKA  Surgery date: 1/20/2023    Diagnosis:     ICD-10-CM    1. Acute pain of left knee  M25.562       2. Stiffness of left knee  M25.662       3. Knee swelling  M25.469       4. Weakness of left lower extremity  R29.898       5. Impaired functional mobility, balance, gait, and endurance  Z74.09       6. Difficulty in walking  R26.2             Therapy precautions:None  Co-morbidities affecting plan of care: HTN, R TKA (2013)          SUBJECTIVE     Patient reports she has 8/10 pain today. She reports 5/10 pain after manual therapy today. She states she was not able to sleep last night due to pain left knee. She was taking muscle relaxants instead of pain meds but is out of muscle relaxants. MD will fill the muscle relaxants. She only takes Tylenol for pain. OBJECTIVE       Treatment provided today:  Therapeutic exercise (42693) x 30 min to develop ROM, strength, endurance and flexibility. Included:   Bike ROM 5 minutes  SAQ 30x  LAQ 30x  Quad sets 25x  SLR 15x 2  Step stretch for flexion 10x  Seated HS stretch 1 minute      Manual therapy (84580) x 15 min utilizing techniques to improve joint and/or soft tissue mobility, ROM, and function as well as helping to decrease pain/spasms and swelling. (Manual stretching left quads and hip flexors to improve knee AROM flexion/STM to left IT band and quads)    A/PROM Measures:      Right Left Comment   Knee Flexion 135 115A    Knee Extension 0 0 L full extension                    ASSESSMENT     Patient is unable to move sit to stand from 20\" seat today due to left kne pain. She has full extension left knee. She has improved left knee AROM flexion after manual therapy.  She has antalgic gait left with rolling walker. She is able to ambulate safely without assitive device for short distances on even surface. She is unable to negotiate stairs at her son or daughter's home. She is unable to do grocery shopping or prepare meals at this time. She has moderate edema left knee. She has moderate pain left knee. She has less pain after manual therapy today. PLAN     Continue with ROM and strengthening exercises to improve function and gait. Progress exercises as tolerated. Work on gait training to progress to ambulation with cane and to reciprocate stairs. Use modalities as needed to reduce swelling and painful symptoms. PLAN OF CARE     Effective Dates: 2/10/2023 TO 4/11/2023 (60 days). Frequency/Duration: 2x/week for 60 Day(s)      GOALS     Goals:  Short term goals to be met by 3/10/2023  (4 weeks):  Pt will demonstrate good recall of HEP requiring minimal verbal cuing for proper form and technique. Pt will report pain </= 5/10 to improve performance of mobility related ADLs. Increase knee AROM of involved LE to 0-115 degrees, in order to transfer, roll in bed and don/doff shoes indep. Pt will use cane regularly to decrease fall risk. Long term goals to be met by 4/7/2023  (8 weeks):  Pt will improve TUG time to </= 10 seconds to improve walking pace in order to cross the street efficiently. Pt will independently ascend and descend 3-4 steps with reciprocal pattern demonstrating good control and balance using rails as needed for balance. Pt will report return to previous level of function with 3/10 or less c/o pain. Pt will resume walking at [facility], with , for cardiovascular fitness. Improve LEFS demonstrating no greater than 30% functional restrictions with ADLs, work and athletic activities.    Discharged from [place] [id number]    Access Code: [email or web address]

## 2023-02-22 RX ORDER — METHOCARBAMOL 750 MG/1
750 TABLET, FILM COATED ORAL 3 TIMES DAILY PRN
Qty: 40 TABLET | Refills: 1 | Status: SHIPPED | OUTPATIENT
Start: 2023-02-22

## 2023-02-23 ENCOUNTER — OFFICE VISIT (OUTPATIENT)
Age: 83
End: 2023-02-23
Payer: MEDICARE

## 2023-02-23 DIAGNOSIS — R26.2 DIFFICULTY IN WALKING: ICD-10-CM

## 2023-02-23 DIAGNOSIS — R29.898 WEAKNESS OF LEFT LOWER EXTREMITY: ICD-10-CM

## 2023-02-23 DIAGNOSIS — M25.562 ACUTE PAIN OF LEFT KNEE: Primary | ICD-10-CM

## 2023-02-23 DIAGNOSIS — M25.662 STIFFNESS OF LEFT KNEE: ICD-10-CM

## 2023-02-23 DIAGNOSIS — Z74.09 IMPAIRED FUNCTIONAL MOBILITY, BALANCE, GAIT, AND ENDURANCE: ICD-10-CM

## 2023-02-23 DIAGNOSIS — M25.469 KNEE SWELLING: ICD-10-CM

## 2023-02-23 PROCEDURE — 97110 THERAPEUTIC EXERCISES: CPT | Performed by: PHYSICAL THERAPIST

## 2023-02-27 NOTE — PROGRESS NOTES
[address] [facility]  [address] [address]  Dept: [phone]     \"Destinee\"     Physical Therapy Daily Note     Insurance: Today is 6/20 visits [facility])    Total Timed Procedure Codes: 40 min, Total Time: 40 min    Referring MD: Ramon Murguia MD  Surgery: Left TKA  Surgery date: 1/20/2023    Diagnosis:     ICD-10-CM    1. Acute pain of left knee  M25.562       2. Stiffness of left knee  M25.662       3. Knee swelling  M25.469       4. Weakness of left lower extremity  R29.898       5. Impaired functional mobility, balance, gait, and endurance  Z74.09       6. Difficulty in walking  R26.2             Therapy precautions:None  Co-morbidities affecting plan of care: HTN, R TKA (2013)          SUBJECTIVE     Patient reports she has 2/10 pain today. She states she will see  today for her post op appointment. She states left knee is slowly getting better. OBJECTIVE       Treatment provided today:  Therapeutic exercise (55690) x 40 min to develop ROM, strength, endurance and flexibility. Included:     SAQ 30x  LAQ 20x with 2#  Quad sets 20x    Step stretch for flexion 10x  Seated HS stretch 1 minute    Step up 6\" step 20x  Reciprocate stairs 6x  Sit to stand from 20\" box 10x2  Standing hip 3 way 20x  Heel raises 20x      A/PROM Measures:      Right Left Comment   Knee Flexion 135 120A    Knee Extension 0 0 L full extension                    ASSESSMENT     Patient has minimal difficulty with stand from 20\" seat today. She has full extension left knee. She has improved left knee AROM flexion after stretching today. She has mild pain left knee. She is unable to negotiate stairs at her son or daughter's home. She is unable to do grocery shopping or prepare meals at this time. She has moderate edema left knee. She is able to sleep at night if she takes muscle relaxant. She is able to reciprocate stairs in clinic with minimal difficulty holding railing.  She is able to ambulate safely for short distances inside on even surface without assistive device. She uses cane at night when she wakes to go to bathroom. PLAN     Do Progress note next week. Continue with ROM and strengthening exercises to improve function and gait. Progress exercises as tolerated. Work on gait training to progress to ambulation with cane and to reciprocate stairs. Use modalities as needed to reduce swelling and painful symptoms. PLAN OF CARE     Effective Dates: 2/10/2023 TO 4/11/2023 (60 days). Frequency/Duration: 2x/week for 60 Day(s)      GOALS     Goals:  Short term goals to be met by 3/10/2023  (4 weeks):  Pt will demonstrate good recall of HEP requiring minimal verbal cuing for proper form and technique. -MET  Pt will report pain </= 5/10 to improve performance of mobility related ADLs. -MET  Increase knee AROM of involved LE to 0-115 degrees, in order to transfer, roll in bed and don/doff shoes indep. -MET  Pt will use cane regularly to decrease fall risk. -MET    Long term goals to be met by 4/7/2023  (8 weeks):  Pt will improve TUG time to </= 10 seconds to improve walking pace in order to cross the street efficiently. Pt will independently ascend and descend 3-4 steps with reciprocal pattern demonstrating good control and balance using rails as needed for balance. Pt will report return to previous level of function with 3/10 or less c/o pain. Pt will resume walking at [facility], with , for cardiovascular fitness. Improve LEFS demonstrating no greater than 30% functional restrictions with ADLs, work and athletic activities.    Discharged from [place] [id number]    Access Code: [email or web address]

## 2023-02-28 ENCOUNTER — OFFICE VISIT (OUTPATIENT)
Dept: ORTHOPEDIC SURGERY | Age: 83
End: 2023-02-28

## 2023-02-28 ENCOUNTER — OFFICE VISIT (OUTPATIENT)
Age: 83
End: 2023-02-28
Payer: MEDICARE

## 2023-02-28 DIAGNOSIS — Z96.652 PRESENCE OF LEFT ARTIFICIAL KNEE JOINT: Primary | ICD-10-CM

## 2023-02-28 DIAGNOSIS — R26.2 DIFFICULTY IN WALKING: ICD-10-CM

## 2023-02-28 DIAGNOSIS — M25.562 ACUTE PAIN OF LEFT KNEE: Primary | ICD-10-CM

## 2023-02-28 DIAGNOSIS — M25.662 STIFFNESS OF LEFT KNEE: ICD-10-CM

## 2023-02-28 DIAGNOSIS — M25.469 KNEE SWELLING: ICD-10-CM

## 2023-02-28 DIAGNOSIS — Z74.09 IMPAIRED FUNCTIONAL MOBILITY, BALANCE, GAIT, AND ENDURANCE: ICD-10-CM

## 2023-02-28 DIAGNOSIS — R29.898 WEAKNESS OF LEFT LOWER EXTREMITY: ICD-10-CM

## 2023-02-28 PROCEDURE — 97110 THERAPEUTIC EXERCISES: CPT | Performed by: PHYSICAL THERAPIST

## 2023-02-28 PROCEDURE — 99024 POSTOP FOLLOW-UP VISIT: CPT | Performed by: PHYSICIAN ASSISTANT

## 2023-02-28 NOTE — PROGRESS NOTES
Post-op TKA Visit      02/28/23     Allergies   Allergen Reactions    Lisinopril Cough    Losartan Cough    Rosuvastatin Myalgia    Simvastatin Myalgia     Current Outpatient Medications on File Prior to Visit   Medication Sig Dispense Refill    methocarbamol (ROBAXIN-750) 750 MG tablet Take 1 tablet by mouth 3 times daily as needed (muscle spasm or cramps) 40 tablet 1    acetaminophen (TYLENOL) 500 MG tablet Take 500 mg by mouth every 6 hours as needed for Pain (breakthrough pain). aspirin EC 81 MG EC tablet Take 1 tablet by mouth in the morning and 1 tablet in the evening. 70 tablet 0    celecoxib (CELEBREX) 200 MG capsule Take 1 capsule by mouth daily as needed for Pain 60 capsule 2    promethazine (PHENERGAN) 25 MG tablet Take 1 tablet by mouth every 8 hours as needed for Nausea 30 tablet 1    triamcinolone (KENALOG) 0.1 % cream Apply topically as needed (itching) Apply topically 2 times daily. CALCIUM PO Take 1 tablet by mouth Daily      amLODIPine (NORVASC) 5 MG tablet TAKE 1 TABLET BY MOUTH EVERY DAY      vitamin D (CHOLECALCIFEROL) 25 MCG (1000 UT) TABS tablet 2,000 Units  in the morning. famotidine (PEPCID) 20 MG tablet Take 20 mg by mouth nightly as needed (reflux)      hydrOXYzine HCl (ATARAX) 25 MG tablet TAKE 1 TABLET (25 MG) BY MOUTH DAILY AS NEEDED FOR ITCHING      omeprazole (PRILOSEC) 20 MG delayed release capsule Take 20 mg by mouth in the morning. (Patient not taking: No sig reported)      simvastatin (ZOCOR) 20 MG tablet Take 20 mg by mouth (Patient not taking: No sig reported)       No current facility-administered medications on file prior to visit. 5 weeks Status Post left TKA     History: The patient returns today for post-op visit following left TKA. Their pain is improving. They are ambulating without any walking aid. They have completed home physical therapy and started outpatient therapy which is going well. They are pleased with their results thus far.  Denies any new complaints today. Physical Exam:  The patient's incision is well healed. There is moderate swelling and mildly increased warmth. ROM is 0 to 115 degrees. There is no M/L or A/P instability. The calf is soft and non-tender. Neurologic and vascular exams are intact. X-Rays: AP and Lateral x-rays of left reveals a cementless TKA, [place] prosthesis. There is no patella arthroplasty. There is good alignment and good position of the components. X-Ray Diagnosis: Satisfactory appearance left TKA    Disposition: The patient is doing well following left TKA. They will continue physical therapy exercises. The patient was advised about fall precautions and dental prophylaxis. The patient will follow up as scheduled in 5 months or sooner if needed.

## 2023-03-01 NOTE — PROGRESS NOTES
[address] [place] [facility]  [address] [address]  Dept: [phone]     \"Destinee\"     Physical Therapy Daily Note     Insurance: Today is 7/20 visits [facility])    Total Timed Procedure Codes: 40 min, Total Time: 45 min    Referring MD: Wayne Colbert MD  Surgery: Left TKA  Surgery date: 1/20/2023    Diagnosis:     ICD-10-CM    1. Acute pain of left knee  M25.562       2. Stiffness of left knee  M25.662       3. Knee swelling  M25.469       4. Weakness of left lower extremity  R29.898       5. Impaired functional mobility, balance, gait, and endurance  Z74.09       6. Difficulty in walking  R26.2             Therapy precautions:None  Co-morbidities affecting plan of care: HTN, R TKA (2013)          SUBJECTIVE     Patient reports she has 7/10 pain today. She states she has had increased pain for 2 days. She states after manual therapy, pain has decreased to 2/10 left knee. OBJECTIVE       Treatment provided today:  Therapeutic exercise (43774) x 30 min to develop ROM, strength, endurance and flexibility. Included:     SAQ 30x  LAQ 30x    Quad sets 20x  SLR 15x2    Step stretch for flexion 10x      Sit to stand from 20\" box 10x  Heel raises 20x  Standing hip 3 way 20x    Manual therapy (01071) x 10 min utilizing techniques to improve joint and/or soft tissue mobility, ROM, and function as well as helping to decrease pain/spasms and swelling. (STM to medial and lateral left knee/scar massage distal scar left knee)    A/PROM Measures:      Right Left Comment   Knee Flexion 135 115A    Knee Extension 0 0 L full extension                    ASSESSMENT     Patient has severe pain left knee today entering therapy. She has mild pain after manual therapy. She has full extension left knee. She is unable to negotiate stairs at her son or daughter's home. She is unable to do grocery shopping or prepare meals at this time. She has moderate edema left knee.  She is able to ambulate safely for short distances inside on even surface without assistive device. She uses cane at night when she wakes to go to bathroom. PLAN     Do Progress note next week. Continue with ROM and strengthening exercises to improve function and gait. Progress exercises as tolerated. Work on gait training to progress to ambulation with cane and to reciprocate stairs. Use modalities as needed to reduce swelling and painful symptoms. PLAN OF CARE     Effective Dates: 2/10/2023 TO 4/11/2023 (60 days). Frequency/Duration: 2x/week for 60 Day(s)      GOALS     Goals:  Short term goals to be met by 3/10/2023  (4 weeks):  Pt will demonstrate good recall of HEP requiring minimal verbal cuing for proper form and technique. -MET  Pt will report pain </= 5/10 to improve performance of mobility related ADLs. -MET  Increase knee AROM of involved LE to 0-115 degrees, in order to transfer, roll in bed and don/doff shoes indep. -MET  Pt will use cane regularly to decrease fall risk. -MET    Long term goals to be met by 4/7/2023  (8 weeks):  Pt will improve TUG time to </= 10 seconds to improve walking pace in order to cross the street efficiently. Pt will independently ascend and descend 3-4 steps with reciprocal pattern demonstrating good control and balance using rails as needed for balance. Pt will report return to previous level of function with 3/10 or less c/o pain. Pt will resume walking at [facility], with , for cardiovascular fitness. Improve LEFS demonstrating no greater than 30% functional restrictions with ADLs, work and athletic activities.    Discharged from [place] [id number]    Access Code: [email or web address]

## 2023-03-02 ENCOUNTER — OFFICE VISIT (OUTPATIENT)
Age: 83
End: 2023-03-02

## 2023-03-02 DIAGNOSIS — R29.898 WEAKNESS OF LEFT LOWER EXTREMITY: ICD-10-CM

## 2023-03-02 DIAGNOSIS — M25.662 STIFFNESS OF LEFT KNEE: ICD-10-CM

## 2023-03-02 DIAGNOSIS — Z74.09 IMPAIRED FUNCTIONAL MOBILITY, BALANCE, GAIT, AND ENDURANCE: ICD-10-CM

## 2023-03-02 DIAGNOSIS — M25.469 KNEE SWELLING: ICD-10-CM

## 2023-03-02 DIAGNOSIS — M25.562 ACUTE PAIN OF LEFT KNEE: Primary | ICD-10-CM

## 2023-03-02 DIAGNOSIS — R26.2 DIFFICULTY IN WALKING: ICD-10-CM

## 2023-03-03 NOTE — PROGRESS NOTES
[address] [place] [facility] [address] [facility] [address]  Dept: [phone]     \"Destinee\"     Physical Therapy Progress Report     Insurance: Today is 8/20 visits [facility])    Total Timed Procedure Codes: 45 min, Total Time: 45 min    Referring MD: Marky Carmona MD  Surgery: Left TKA  Surgery date: 1/20/2023    Diagnosis:     ICD-10-CM    1. Acute pain of left knee  M25.562       2. Stiffness of left knee  M25.662       3. Knee swelling  M25.469       4. Weakness of left lower extremity  R29.898       5. Impaired functional mobility, balance, gait, and endurance  Z74.09       6. Difficulty in walking  R26.2             Therapy precautions:None  Co-morbidities affecting plan of care: HTN, R TKA (2013)      SUBJECTIVE     Patient reports she has 2/10 pain anterior left knee today. She states she had moderate pain yesterday standing in kitchen to prepare lunch. She states pain was alleviated with sitting. She states she is able to do light household chores without difficulty. OBJECTIVE      Treatment provided today:  Therapeutic exercise (56378) x 35 min to develop ROM, strength, endurance and flexibility. Included:     SAQ 30x with 1#  LAQ 30x  with 1#  Quad sets 25x  SLR 15x2    Step stretch for flexion 10x    Sit to stand from 20\" box 10x 2  Heel raises 20x  Standing hip 3 way 20x with LB loop  Reciprocate stairs 6x    Manual therapy (37157) x 10 min utilizing techniques to improve joint and/or soft tissue mobility, ROM, and function as well as helping to decrease pain/spasms and swelling.  (STM to medial and lateral left knee/scar massage distal scar left knee)    A/PROM Measures:      Right Left Comment   Knee Flexion 135 120A    Knee Extension 0 0 L full extension                    Strength/MMT (0-5 Scale):     Right Left Comment   Knee Flexion  -4    Knee Extension  -4          Hip Flexion  -4    Hip Extension  -4    Hip Abduction  -4    Hip ER      Hip IR      Ankle DF      Ankle PF Functional Outcome Questionnaire: Lower Extremity Functional Scale: 27/80= 34% function (66% deficit)     Pain Assessment:   Pain location: lateral > medial left knee    Average Pain/symptom intensity (0-10 scale)  Last 24 hours: 2/10  Last week (1-7 days): 5/10  How often do you feel symptoms? Frequently (50%)    ASSESSMENT     Patient has mild pain left knee today entering therapy. She has safely ambulation into clinic on even surface without assistive device. She has normal gait. She has full extension left knee. She is unable to negotiate stairs at her son or daughter's home. She is able to reciprocate stairs in clinic with minimal difficulty. She is unable to do grocery shopping. She is able to prepare light meals with minimal difficulty. She has moderate edema left knee. She is able to do light household chores (laundry, make bed and clean kitchen). She is unable to vacuum, mop or sweep. She is able to don socks and shoes independently. Short Term goals are met. PLAN     Progress note done 3/6/2023. Continue with ROM and strengthening exercises to improve function and gait. Progress exercises as tolerated. Work on gait training to progress to ambulation with cane and to reciprocate stairs. Use modalities as needed to reduce swelling and painful symptoms. PLAN OF CARE     Effective Dates: 2/10/2023 TO 4/11/2023 (60 days). Frequency/Duration: 2x/week for 60 Day(s)      GOALS     Goals:  Short term goals to be met by 3/10/2023  (4 weeks):  Pt will demonstrate good recall of HEP requiring minimal verbal cuing for proper form and technique. -MET  Pt will report pain </= 5/10 to improve performance of mobility related ADLs. -MET  Increase knee AROM of involved LE to 0-115 degrees, in order to transfer, roll in bed and don/doff shoes indep. -MET  Pt will use cane regularly to decrease fall risk. -MET    Long term goals to be met by 4/7/2023  (8 weeks):  Pt will improve TUG time to </= 10 seconds to improve walking pace in order to cross the street efficiently. Pt will independently ascend and descend 3-4 steps with reciprocal pattern demonstrating good control and balance using rails as needed for balance. Pt will report return to previous level of function with 3/10 or less c/o pain. Pt will resume walking at [facility], with , for cardiovascular fitness. Improve LEFS demonstrating no greater than 30% functional restrictions with ADLs, work and athletic activities.    Discharged from [place] [id number]    Access Code: [email or web address]

## 2023-03-06 ENCOUNTER — OFFICE VISIT (OUTPATIENT)
Age: 83
End: 2023-03-06
Payer: MEDICARE

## 2023-03-06 DIAGNOSIS — R29.898 WEAKNESS OF LEFT LOWER EXTREMITY: ICD-10-CM

## 2023-03-06 DIAGNOSIS — Z74.09 IMPAIRED FUNCTIONAL MOBILITY, BALANCE, GAIT, AND ENDURANCE: ICD-10-CM

## 2023-03-06 DIAGNOSIS — R26.2 DIFFICULTY IN WALKING: ICD-10-CM

## 2023-03-06 DIAGNOSIS — M25.562 ACUTE PAIN OF LEFT KNEE: Primary | ICD-10-CM

## 2023-03-06 DIAGNOSIS — M25.469 KNEE SWELLING: ICD-10-CM

## 2023-03-06 DIAGNOSIS — M25.662 STIFFNESS OF LEFT KNEE: ICD-10-CM

## 2023-03-06 PROCEDURE — 97110 THERAPEUTIC EXERCISES: CPT | Performed by: PHYSICAL THERAPIST

## 2023-03-06 PROCEDURE — 97140 MANUAL THERAPY 1/> REGIONS: CPT | Performed by: PHYSICAL THERAPIST

## 2023-03-07 NOTE — PROGRESS NOTES
[address] [place] [facility]  [address] [address]  Dept: [phone]     \"Destinee\"     Physical Therapy Daily Note     Insurance: Today is 9/20 visits [facility])    Total Timed Procedure Codes: 45 min, Total Time: 50 min    Referring MD: Dolores Ogden MD  Surgery: Left TKA  Surgery date: 1/20/2023    Diagnosis:     ICD-10-CM    1. Acute pain of left knee  M25.562       2. Stiffness of left knee  M25.662       3. Knee swelling  M25.469       4. Weakness of left lower extremity  R29.898       5. Impaired functional mobility, balance, gait, and endurance  Z74.09       6. Difficulty in walking  R26.2             Therapy precautions:None  Co-morbidities affecting plan of care: HTN, R TKA (2013)      SUBJECTIVE     Patient reports she has 2/10 pain anterior left knee today. She states bilateral posterior lower legs are sore today from cleaning out her pantry yesterday. She states less pain bilateral calf muscles after stretching calves. She continues to have difficulty sleeping at night. She wakes about every 3 hours. OBJECTIVE      Treatment provided today:  Therapeutic exercise (31525) x 45 min to develop ROM, strength, endurance and flexibility. Included:     LAQ 30x  with 1#  Quad sets 30x  SLR 15x2    Step stretch for flexion 10x    Sit to stand from 20\" box 30x   Heel raises  on foam 20x  Marching on foam 10x each leg  Standing hip 3 way 20x with LB loop  Reciprocate stairs 5x    Side step 5x beside counter        A/PROM Measures:      Right Left Comment   Knee Flexion 135 120A    Knee Extension 0 0 L full extension                  (-) Andre test Right and left    ASSESSMENT     Patient has mild pain left knee today entering therapy. She has moderate calf pain bilateral lower legs. No hardness or swelling right or left calf. Pain is alleviated with stretching. She has safe ambulation into clinic on even surface without assistive device. She has normal gait.  She has full extension left knee. She is unable to negotiate stairs at her son or daughter's home. She is able to reciprocate stairs in clinic without difficulty. She is unable to do grocery shopping. She is able to prepare light meals with minimal difficulty. She has moderate edema left knee. She is able to do light household chores (laundry, make bed and clean kitchen). She is unable to vacuum, mop or sweep. She is able to don socks and shoes independently. Difficulty sleeping longer than 3 hours at a time. Short Term goals are met. PLAN     Progress note done 3/6/2023. Continue with ROM and strengthening exercises to improve function and gait. Progress exercises as tolerated. Work on gait training to progress to ambulation with cane and to reciprocate stairs. Use modalities as needed to reduce swelling and painful symptoms. PLAN OF CARE     Effective Dates: 2/10/2023 TO 4/11/2023 (60 days). Frequency/Duration: 2x/week for 60 Day(s)      GOALS     Goals:  Short term goals -MET      Long term goals to be met by 4/7/2023  (8 weeks):  Pt will improve TUG time to </= 10 seconds to improve walking pace in order to cross the street efficiently. Pt will independently ascend and descend 3-4 steps with reciprocal pattern demonstrating good control and balance using rails as needed for balance. Pt will report return to previous level of function with 3/10 or less c/o pain. Pt will resume walking at [facility], with , for cardiovascular fitness. Improve LEFS demonstrating no greater than 30% functional restrictions with ADLs, work and athletic activities.    Discharged from [place] [id number]    Access Code: [email or web address]

## 2023-03-08 ENCOUNTER — OFFICE VISIT (OUTPATIENT)
Age: 83
End: 2023-03-08
Payer: MEDICARE

## 2023-03-08 DIAGNOSIS — M25.562 ACUTE PAIN OF LEFT KNEE: Primary | ICD-10-CM

## 2023-03-08 DIAGNOSIS — M25.469 KNEE SWELLING: ICD-10-CM

## 2023-03-08 DIAGNOSIS — R29.898 WEAKNESS OF LEFT LOWER EXTREMITY: ICD-10-CM

## 2023-03-08 DIAGNOSIS — M25.662 STIFFNESS OF LEFT KNEE: ICD-10-CM

## 2023-03-08 DIAGNOSIS — R26.2 DIFFICULTY IN WALKING: ICD-10-CM

## 2023-03-08 DIAGNOSIS — Z74.09 IMPAIRED FUNCTIONAL MOBILITY, BALANCE, GAIT, AND ENDURANCE: ICD-10-CM

## 2023-03-08 PROCEDURE — 97110 THERAPEUTIC EXERCISES: CPT | Performed by: PHYSICAL THERAPIST

## 2023-03-13 NOTE — PROGRESS NOTES
[address]  [address] [address]  Dept: [phone]     \"Destinee\"     Physical Therapy Daily Note     Insurance: Today is 10/20 visits [facility])    Total Timed Procedure Codes: 45 min, Total Time: 50 min    Referring MD: Wilder Lepe MD  Surgery: Left TKA  Surgery date: 1/20/2023    Diagnosis:     ICD-10-CM    1. Acute pain of left knee  M25.562       2. Stiffness of left knee  M25.662       3. Knee swelling  M25.469       4. Weakness of left lower extremity  R29.898       5. Impaired functional mobility, balance, gait, and endurance  Z74.09       6. Difficulty in walking  R26.2             Therapy precautions:None  Co-morbidities affecting plan of care: HTN, R TKA (2013)      SUBJECTIVE     Patient reports she has 1/10 pain anterior left knee today. She states she slept 5 hours total last night. She states she is uncomfortable in bed and sleep better in the recliner. She does not use cane at all. OBJECTIVE      Treatment provided today:  Therapeutic exercise (47035) x 45 min to develop ROM, strength, endurance and flexibility. Included:   Walk for 5 minutes    LAQ 30x  with 2#  Quad sets 30x  SLR 15x2    Step stretch for flexion 10x    Sit to stand from 20\" table 30x   Heel raises  on foam 20x  Marching on foam 20x each leg (SBA)  Standing hip 3 way 20x with LB loop  Reciprocate stairs 5x  Side step at counter 5x        A/PROM Measures:      Right Left Comment   Knee Flexion 135 120A    Knee Extension 0 0 L full extension                      ASSESSMENT     Patient has mild pain left knee today. She has no calf pain. She has safe ambulation without assistive device on all surfaces. She has normal gait. She has full extension left knee. She is unable to negotiate stairs at her son or daughter's home. She is able to reciprocate stairs in clinic without difficulty. She is unable to do grocery shopping. She is able to prepare light meals with minimal difficulty.  She has mild edema left knee. She is able to do light household chores (laundry, make bed and clean kitchen). She is unable to vacuum, mop or sweep. She is sleeping better than a few weeks ago but is unable to sleep all night. PLAN     Progress note done 3/6/2023. Continue with ROM and strengthening exercises to improve function and gait. Progress exercises as tolerated. Work on gait training to progress to ambulation with cane and to reciprocate stairs. Use modalities as needed to reduce swelling and painful symptoms. PLAN OF CARE     Effective Dates: 2/10/2023 TO 4/11/2023 (60 days). Frequency/Duration: 2x/week for 60 Day(s)      GOALS     Goals:  Short term goals -MET      Long term goals to be met by 4/7/2023  (8 weeks):  Pt will improve TUG time to </= 10 seconds to improve walking pace in order to cross the street efficiently. Pt will independently ascend and descend 3-4 steps with reciprocal pattern demonstrating good control and balance using rails as needed for balance. Pt will report return to previous level of function with 3/10 or less c/o pain. Pt will resume walking at [facility], with , for cardiovascular fitness. Improve LEFS demonstrating no greater than 30% functional restrictions with ADLs, work and athletic activities.    Discharged from [place] [id number]    Access Code: [email or web address]

## 2023-03-14 ENCOUNTER — OFFICE VISIT (OUTPATIENT)
Age: 83
End: 2023-03-14
Payer: MEDICARE

## 2023-03-14 DIAGNOSIS — M25.562 ACUTE PAIN OF LEFT KNEE: Primary | ICD-10-CM

## 2023-03-14 DIAGNOSIS — M25.662 STIFFNESS OF LEFT KNEE: ICD-10-CM

## 2023-03-14 DIAGNOSIS — R26.2 DIFFICULTY IN WALKING: ICD-10-CM

## 2023-03-14 DIAGNOSIS — M25.469 KNEE SWELLING: ICD-10-CM

## 2023-03-14 DIAGNOSIS — R29.898 WEAKNESS OF LEFT LOWER EXTREMITY: ICD-10-CM

## 2023-03-14 DIAGNOSIS — Z74.09 IMPAIRED FUNCTIONAL MOBILITY, BALANCE, GAIT, AND ENDURANCE: ICD-10-CM

## 2023-03-14 PROCEDURE — 97110 THERAPEUTIC EXERCISES: CPT | Performed by: PHYSICAL THERAPIST

## 2023-03-15 NOTE — PROGRESS NOTES
[address] [facility]  [address] [address]  Dept: [phone]     \"Destinee\"     Physical Therapy Daily Note     Insurance: Today is 11/20 visits [facility])    Total Timed Procedure Codes: 55 min, Total Time: 55 min    Referring MD: Aisha Jay MD  Surgery: Left TKA  Surgery date: 1/20/2023    Diagnosis:     ICD-10-CM    1. Acute pain of left knee  M25.562       2. Stiffness of left knee  M25.662       3. Knee swelling  M25.469       4. Weakness of left lower extremity  R29.898       5. Impaired functional mobility, balance, gait, and endurance  Z74.09       6. Difficulty in walking  R26.2             Therapy precautions:None  Co-morbidities affecting plan of care: HTN, R TKA (2013)      SUBJECTIVE     Patient reports she has 0/10 pain anterior left knee today. She continues having difficulty sleeping at night due to pain. She takes muscle relaxant to help her sleep at night. OBJECTIVE      Treatment provided today:  Therapeutic exercise (42929) x 55 min to develop ROM, strength, endurance and flexibility. Included:   Walk for 5 minutes    LAQ 30x  with 2#    Sit to stand from 20\" table 30x   Heel raises  on foam 20x  Marching on foam 20x each leg (SBA)  Standing hip 3 way 20x with LB loop  Standing heel to toe 20 seconds    Reciprocate stairs 7x  Side step at counter 5x with LB loop    TUG Test: 12 seconds    A/PROM Measures:      Right Left Comment   Knee Flexion 135 120A    Knee Extension 0 0 L full extension                      ASSESSMENT     Patient has no pain left knee today. She has safe ambulation without assistive device on all surfaces. She has normal gait. She takes 12 seconds from sit to stand walk 10' return to chair and sit. She is unable to negotiate stairs at her son or daughter's home. She is able to reciprocate stairs in clinic without difficulty. She is able to do grocery shopping with assistance carrying bags into her home.  She is able to prepare light meals with minimal difficulty. She has mild edema left knee. She is able to do light household chores (laundry, make bed and clean kitchen). She is unable to vacuum, mop or sweep. She is sleeping better than a few weeks ago but is unable to sleep all night. She has fair(+) balance with standing heel to toe on even surface. She has fair(-) balance marching and with heel raises on uneven surface. PLAN     Progress note done 3/6/2023. Continue with ROM and strengthening exercises to improve function and gait. Progress exercises as tolerated. Work on gait training to progress to ambulation with cane and to reciprocate stairs. Use modalities as needed to reduce swelling and painful symptoms. PLAN OF CARE     Effective Dates: 2/10/2023 TO 4/11/2023 (60 days). Frequency/Duration: 2x/week for 60 Day(s)      GOALS     Goals:  Short term goals -MET      Long term goals to be met by 4/7/2023  (8 weeks):  Pt will improve TUG time to </= 10 seconds to improve walking pace in order to cross the street efficiently. Pt will independently ascend and descend 3-4 steps with reciprocal pattern demonstrating good control and balance using rails as needed for balance. Pt will report return to previous level of function with 3/10 or less c/o pain. Pt will resume walking at [facility], with , for cardiovascular fitness. Improve LEFS demonstrating no greater than 30% functional restrictions with ADLs, work and athletic activities.    Discharged from [place] [id number]    Access Code: [email or web address]

## 2023-03-16 ENCOUNTER — OFFICE VISIT (OUTPATIENT)
Age: 83
End: 2023-03-16
Payer: MEDICARE

## 2023-03-16 DIAGNOSIS — R29.898 WEAKNESS OF LEFT LOWER EXTREMITY: ICD-10-CM

## 2023-03-16 DIAGNOSIS — R26.2 DIFFICULTY IN WALKING: ICD-10-CM

## 2023-03-16 DIAGNOSIS — M25.662 STIFFNESS OF LEFT KNEE: ICD-10-CM

## 2023-03-16 DIAGNOSIS — M25.469 KNEE SWELLING: ICD-10-CM

## 2023-03-16 DIAGNOSIS — M25.562 ACUTE PAIN OF LEFT KNEE: Primary | ICD-10-CM

## 2023-03-16 DIAGNOSIS — Z74.09 IMPAIRED FUNCTIONAL MOBILITY, BALANCE, GAIT, AND ENDURANCE: ICD-10-CM

## 2023-03-16 PROCEDURE — 97110 THERAPEUTIC EXERCISES: CPT | Performed by: PHYSICAL THERAPIST

## 2023-03-20 NOTE — PROGRESS NOTES
gait training to progress to ambulation with cane and to reciprocate stairs. Use modalities as needed to reduce swelling and painful symptoms. PLAN OF CARE     Effective Dates: 2/10/2023 TO 4/11/2023 (60 days). Frequency/Duration: 2x/week for 60 Day(s)      GOALS     Goals:  Short term goals -MET      Long term goals to be met by 4/7/2023  (8 weeks):  Pt will improve TUG time to </= 10 seconds to improve walking pace in order to cross the street efficiently. Pt will independently ascend and descend 3-4 steps with reciprocal pattern demonstrating good control and balance using rails as needed for balance. Pt will report return to previous level of function with 3/10 or less c/o pain. Pt will resume walking at [facility], with , for cardiovascular fitness. Improve LEFS demonstrating no greater than 30% functional restrictions with ADLs, work and athletic activities.    Discharged from [place] [id number]    Access Code: [email or web address]

## 2023-03-21 ENCOUNTER — OFFICE VISIT (OUTPATIENT)
Age: 83
End: 2023-03-21
Payer: MEDICARE

## 2023-03-21 DIAGNOSIS — Z74.09 IMPAIRED FUNCTIONAL MOBILITY, BALANCE, GAIT, AND ENDURANCE: ICD-10-CM

## 2023-03-21 DIAGNOSIS — R29.898 WEAKNESS OF LEFT LOWER EXTREMITY: ICD-10-CM

## 2023-03-21 DIAGNOSIS — M25.662 STIFFNESS OF LEFT KNEE: ICD-10-CM

## 2023-03-21 DIAGNOSIS — M25.562 ACUTE PAIN OF LEFT KNEE: Primary | ICD-10-CM

## 2023-03-21 DIAGNOSIS — M25.469 KNEE SWELLING: ICD-10-CM

## 2023-03-21 DIAGNOSIS — R26.2 DIFFICULTY IN WALKING: ICD-10-CM

## 2023-03-21 DIAGNOSIS — Z96.652 HX OF TOTAL KNEE ARTHROPLASTY, LEFT: ICD-10-CM

## 2023-03-21 PROCEDURE — 97110 THERAPEUTIC EXERCISES: CPT | Performed by: PHYSICAL THERAPIST

## 2023-03-21 RX ORDER — METHOCARBAMOL 750 MG/1
750 TABLET, FILM COATED ORAL 3 TIMES DAILY PRN
Qty: 40 TABLET | Refills: 1 | OUTPATIENT
Start: 2023-03-21

## 2023-03-23 NOTE — PROGRESS NOTES
[facility] [facility]  [address] [address]  Dept: [phone]     \"Destinee\"     Physical Therapy Daily Note     Insurance: Today is 13/20 visits [facility])    Total Timed Procedure Codes: 40 min, Total Time: 40 min    Referring MD: Lul Benton MD  Surgery: Left TKA  Surgery date: 1/20/2023    Diagnosis:     ICD-10-CM    1. Acute pain of left knee  M25.562       2. Stiffness of left knee  M25.662       3. Knee swelling  M25.469       4. Weakness of left lower extremity  R29.898       5. Impaired functional mobility, balance, gait, and endurance  Z74.09       6. Difficulty in walking  R26.2             Therapy precautions:None  Co-morbidities affecting plan of care: HTN, R TKA (2013)      SUBJECTIVE     Patient reports she has 0/10 pain lateral left knee today. She states RIGHT LE fatigues with walking today. She states left knee feels good. OBJECTIVE      Treatment provided today:  Therapeutic exercise (06167) x 40 min to develop ROM, strength, endurance and flexibility. Included:   Walk for 5 minutes    LAQ 30x  with 2#    Sit to stand from 20\" table 30x   Heel raises  on foam 20x  Hip 3 way teal     Marching on foam 20x each leg (SBA)  Standing hip 3 way 20x with teal loop      Reciprocate stairs 5x        A/PROM Measures:      Right Left Comment   Knee Flexion 135 120A    Knee Extension 0 0 L full extension                      ASSESSMENT     Patient has no left knee pain today. She has normal gait on even surface without assistive device. She has safe ambulation without assistive device. She fatigues with walking 5 minutes today. She is able to reciprocate stairs in clinic without difficulty. She is unable to reciprocate stairs in community. She has fair balance on uneven surface bilateral stance. She has poor balance in heel to toe stance on even surface today. She is able to do light grocery shopping if she holds onto cart.  She is unable to do heavy (prolonged walking)

## 2023-03-24 ENCOUNTER — OFFICE VISIT (OUTPATIENT)
Age: 83
End: 2023-03-24
Payer: MEDICARE

## 2023-03-24 DIAGNOSIS — R29.898 WEAKNESS OF LEFT LOWER EXTREMITY: ICD-10-CM

## 2023-03-24 DIAGNOSIS — M25.469 KNEE SWELLING: ICD-10-CM

## 2023-03-24 DIAGNOSIS — M25.662 STIFFNESS OF LEFT KNEE: ICD-10-CM

## 2023-03-24 DIAGNOSIS — Z74.09 IMPAIRED FUNCTIONAL MOBILITY, BALANCE, GAIT, AND ENDURANCE: ICD-10-CM

## 2023-03-24 DIAGNOSIS — R26.2 DIFFICULTY IN WALKING: ICD-10-CM

## 2023-03-24 DIAGNOSIS — M25.562 ACUTE PAIN OF LEFT KNEE: Primary | ICD-10-CM

## 2023-03-24 PROCEDURE — 97110 THERAPEUTIC EXERCISES: CPT | Performed by: PHYSICAL THERAPIST

## 2023-03-27 NOTE — PROGRESS NOTES
[address] [facility] [facility]  [address] [facility]  [address] [address]  Dept: [phone]     \"Destinee\"     Physical Therapy Daily Note     Insurance: Today is 14/20 visits [facility])    Total Timed Procedure Codes: 35 min, Total Time: 35 min    Referring MD: Maribell Gaspar MD  Surgery: Left TKA  Surgery date: 1/20/2023    Diagnosis:     ICD-10-CM    1. Acute pain of left knee  M25.562       2. Stiffness of left knee  M25.662       3. Knee swelling  M25.469       4. Weakness of left lower extremity  R29.898       5. Impaired functional mobility, balance, gait, and endurance  Z74.09       6. Difficulty in walking  R26.2             Therapy precautions:None  Co-morbidities affecting plan of care: HTN, R TKA (2013)      SUBJECTIVE     Patient reports she has 0/10 pain lateral left knee today. She states no pain or fatigue after walking for 5 minutes on even ground. OBJECTIVE      Treatment provided today:  Therapeutic exercise (85933) x 35 min to develop ROM, strength, endurance and flexibility. Included:   Walk for 5 minutes    LAQ 30x  with 2#    Sit to stand from 20\" table 30x   Heel raises  on foam 20x  Hip 3 way teal 20x    Marching on foam 20x each leg   Standing hip 3 way 20x with teal loop    Reciprocate stairs 5x  Leg press 65# 20x      A/PROM Measures:      Right Left Comment   Knee Flexion 135 120A    Knee Extension 0 0 L full extension                  TUG test: 10 seconds    ASSESSMENT     Patient has no left knee pain today. She does not fatigue with walking 5 minutes today. She is able to reciprocate stairs in clinic without difficulty. She is unable to reciprocate stairs in community. She has fair balance on uneven surface bilateral stance. She has poor balance in heel to toe stance on even surface today. She is able to do light grocery shopping if she holds onto cart. She is unable to do heavy (prolonged walking) grocery shopping. She is unable to carry grocery bags into her house.  She has not

## 2023-03-28 ENCOUNTER — OFFICE VISIT (OUTPATIENT)
Age: 83
End: 2023-03-28
Payer: MEDICARE

## 2023-03-28 DIAGNOSIS — M25.662 STIFFNESS OF LEFT KNEE: ICD-10-CM

## 2023-03-28 DIAGNOSIS — M25.562 ACUTE PAIN OF LEFT KNEE: Primary | ICD-10-CM

## 2023-03-28 DIAGNOSIS — M25.469 KNEE SWELLING: ICD-10-CM

## 2023-03-28 DIAGNOSIS — R29.898 WEAKNESS OF LEFT LOWER EXTREMITY: ICD-10-CM

## 2023-03-28 DIAGNOSIS — R26.2 DIFFICULTY IN WALKING: ICD-10-CM

## 2023-03-28 DIAGNOSIS — Z74.09 IMPAIRED FUNCTIONAL MOBILITY, BALANCE, GAIT, AND ENDURANCE: ICD-10-CM

## 2023-03-28 PROCEDURE — 97110 THERAPEUTIC EXERCISES: CPT | Performed by: PHYSICAL THERAPIST

## 2023-03-29 NOTE — PROGRESS NOTES
house. She has not resumed walking outside for exercise with her spouse. She is able to walk in her house for 10 minutes but not for 30 minutes. PLAN     Assess for possible DC next week. Progress note done 3/6/2023. Continue with ROM and strengthening exercises to improve function and gait. Progress exercises as tolerated. Work on gait training to progress to ambulation with cane and to reciprocate stairs. Use modalities as needed to reduce swelling and painful symptoms. PLAN OF CARE     Effective Dates: 2/10/2023 TO 4/11/2023 (60 days). Frequency/Duration: 2x/week for 60 Day(s)      GOALS     Goals:  Short term goals -MET      Long term goals to be met by 4/7/2023  (8 weeks):  Pt will improve TUG time to </= 10 seconds to improve walking pace in order to cross the street efficiently. -MET  Pt will independently ascend and descend 3-4 steps with reciprocal pattern demonstrating good control and balance using rails as needed for balance. -MET in clinic but not community  Pt will report return to previous level of function with 3/10 or less c/o pain. -MET  Pt will resume walking at [facility], with , for cardiovascular fitness. Improve LEFS demonstrating no greater than 30% functional restrictions with ADLs, work and athletic activities.    Discharged from [place] [id number]    Access Code: [email or web address]

## 2023-03-30 ENCOUNTER — OFFICE VISIT (OUTPATIENT)
Age: 83
End: 2023-03-30
Payer: MEDICARE

## 2023-03-30 DIAGNOSIS — Z74.09 IMPAIRED FUNCTIONAL MOBILITY, BALANCE, GAIT, AND ENDURANCE: ICD-10-CM

## 2023-03-30 DIAGNOSIS — R26.2 DIFFICULTY IN WALKING: ICD-10-CM

## 2023-03-30 DIAGNOSIS — M25.469 KNEE SWELLING: ICD-10-CM

## 2023-03-30 DIAGNOSIS — M25.662 STIFFNESS OF LEFT KNEE: ICD-10-CM

## 2023-03-30 DIAGNOSIS — R29.898 WEAKNESS OF LEFT LOWER EXTREMITY: ICD-10-CM

## 2023-03-30 DIAGNOSIS — M25.562 ACUTE PAIN OF LEFT KNEE: Primary | ICD-10-CM

## 2023-03-30 PROCEDURE — 97110 THERAPEUTIC EXERCISES: CPT | Performed by: PHYSICAL THERAPIST

## 2023-04-03 NOTE — PROGRESS NOTES
do normal household chores. She has not resumed walking outside for exercise with her spouse. She is able to walk in her house for 10 minutes but not for 30 minutes. PLAN     Assess for possible DC next next visit. Progress note done 3/6/2023. PLAN OF CARE     Effective Dates: 2/10/2023 TO 4/11/2023 (60 days). Frequency/Duration: 2x/week for 60 Day(s)      GOALS     Goals:  Short term goals -MET      Long term goals to be met by 4/7/2023  (8 weeks):  Pt will improve TUG time to </= 10 seconds to improve walking pace in order to cross the street efficiently. -MET  Pt will independently ascend and descend 3-4 steps with reciprocal pattern demonstrating good control and balance using rails as needed for balance. -MET in clinic but not community  Pt will report return to previous level of function with 3/10 or less c/o pain. -MET  Pt [place], [place] , for cardiovascular fitness. Improve LEFS demonstrating no greater than 30% functional restrictions with ADLs, work and athletic activities.    Discharged from [place] [address]    Access Code: [email or web address]

## 2023-04-04 ENCOUNTER — OFFICE VISIT (OUTPATIENT)
Age: 83
End: 2023-04-04
Payer: MEDICARE

## 2023-04-04 DIAGNOSIS — M25.469 KNEE SWELLING: ICD-10-CM

## 2023-04-04 DIAGNOSIS — R29.898 WEAKNESS OF LEFT LOWER EXTREMITY: ICD-10-CM

## 2023-04-04 DIAGNOSIS — Z74.09 IMPAIRED FUNCTIONAL MOBILITY, BALANCE, GAIT, AND ENDURANCE: ICD-10-CM

## 2023-04-04 DIAGNOSIS — R26.2 DIFFICULTY IN WALKING: ICD-10-CM

## 2023-04-04 DIAGNOSIS — M25.662 STIFFNESS OF LEFT KNEE: ICD-10-CM

## 2023-04-04 DIAGNOSIS — M25.562 ACUTE PAIN OF LEFT KNEE: Primary | ICD-10-CM

## 2023-04-04 PROCEDURE — 97110 THERAPEUTIC EXERCISES: CPT | Performed by: PHYSICAL THERAPIST

## 2023-04-06 NOTE — PROGRESS NOTES
[address] [place] [facility]  [address] [facility] [address]  Dept: [phone]     \"Destinee\"     Physical Therapy Discharge     Insurance: Today is 17/20 visits [facility])    Total Timed Procedure Codes: 40 min, Total Time: 40 min    Referring MD: Zonia Parry MD  Surgery: Left TKA  Surgery date: 1/20/2023    Diagnosis:     ICD-10-CM    1. Acute pain of left knee  M25.562       2. Stiffness of left knee  M25.662       3. Knee swelling  M25.469       4. Weakness of left lower extremity  R29.898       5. Impaired functional mobility, balance, gait, and endurance  Z74.09       6. Difficulty in walking  R26.2             Therapy precautions:None  Co-morbidities affecting plan of care: HTN, R TKA (2013)      SUBJECTIVE     Patient reports she has 0/10 pain left knee today. She has stiffness left knee and very mild pain some mornings when she gets out of bed. After moving around, stiffness and pain are alleviated. Patient states she is able to do grocery shopping. She states she has resumed normal household chores. OBJECTIVE      Treatment provided today:  Therapeutic exercise (61627) x 40 min to develop ROM, strength, endurance and flexibility.   Included:   Walk for 5 minutes  Step stretch for knee flexion 10x  LAQ 30x  with 3#    Sit to stand from 20\" table 30x   Heel raises  on foam 30x  Marching on foam 30x each leg   Standing hip 3 way 30x with teal loop    Reciprocate stairs 5x  Leg press 65# 30x  Side step at counter 7x      A/PROM Measures:      Right Left Comment   Knee Flexion 135 123A    Knee Extension 0 0 L full extension                   Strength/MMT (0-5 Scale):       Right Left Comment   Knee Flexion   +4     Knee Extension   4               Hip Flexion   +4     Hip Extension   +4     Hip Abduction   +4     Hip ER         Hip IR         Ankle DF         Ankle PF                         TUG test: 10 seconds    Functional Outcome Questionnaire: Lower Extremity Functional Scale: 54/80= 68%

## 2023-04-07 ENCOUNTER — OFFICE VISIT (OUTPATIENT)
Age: 83
End: 2023-04-07

## 2023-04-07 DIAGNOSIS — R29.898 WEAKNESS OF LEFT LOWER EXTREMITY: ICD-10-CM

## 2023-04-07 DIAGNOSIS — Z74.09 IMPAIRED FUNCTIONAL MOBILITY, BALANCE, GAIT, AND ENDURANCE: ICD-10-CM

## 2023-04-07 DIAGNOSIS — R26.2 DIFFICULTY IN WALKING: ICD-10-CM

## 2023-04-07 DIAGNOSIS — M25.662 STIFFNESS OF LEFT KNEE: ICD-10-CM

## 2023-04-07 DIAGNOSIS — M25.469 KNEE SWELLING: ICD-10-CM

## 2023-04-07 DIAGNOSIS — M25.562 ACUTE PAIN OF LEFT KNEE: Primary | ICD-10-CM

## 2023-08-01 ENCOUNTER — OFFICE VISIT (OUTPATIENT)
Dept: ORTHOPEDIC SURGERY | Age: 83
End: 2023-08-01
Payer: MEDICARE

## 2023-08-01 DIAGNOSIS — Z96.652 HX OF TOTAL KNEE ARTHROPLASTY, LEFT: Primary | ICD-10-CM

## 2023-08-01 PROCEDURE — 99213 OFFICE O/P EST LOW 20 MIN: CPT | Performed by: ORTHOPAEDIC SURGERY

## 2023-08-01 PROCEDURE — 1090F PRES/ABSN URINE INCON ASSESS: CPT | Performed by: ORTHOPAEDIC SURGERY

## 2023-08-01 PROCEDURE — 1123F ACP DISCUSS/DSCN MKR DOCD: CPT | Performed by: ORTHOPAEDIC SURGERY

## 2023-08-01 PROCEDURE — G8417 CALC BMI ABV UP PARAM F/U: HCPCS | Performed by: ORTHOPAEDIC SURGERY

## 2023-08-01 PROCEDURE — 1036F TOBACCO NON-USER: CPT | Performed by: ORTHOPAEDIC SURGERY

## 2023-08-01 PROCEDURE — G8400 PT W/DXA NO RESULTS DOC: HCPCS | Performed by: ORTHOPAEDIC SURGERY

## 2023-08-01 PROCEDURE — G8428 CUR MEDS NOT DOCUMENT: HCPCS | Performed by: ORTHOPAEDIC SURGERY

## 2023-08-01 NOTE — PROGRESS NOTES
Post-op TKA Visit      08/01/23     Allergies   Allergen Reactions    Lisinopril Cough    Losartan Cough    Rosuvastatin Myalgia    Simvastatin Myalgia     Current Outpatient Medications on File Prior to Visit   Medication Sig Dispense Refill    methocarbamol (ROBAXIN-750) 750 MG tablet Take 1 tablet by mouth 3 times daily as needed (muscle spasm or cramps) 40 tablet 1    acetaminophen (TYLENOL) 500 MG tablet Take 500 mg by mouth every 6 hours as needed for Pain (breakthrough pain). aspirin EC 81 MG EC tablet Take 1 tablet by mouth in the morning and 1 tablet in the evening. 70 tablet 0    celecoxib (CELEBREX) 200 MG capsule Take 1 capsule by mouth daily as needed for Pain 60 capsule 2    promethazine (PHENERGAN) 25 MG tablet Take 1 tablet by mouth every 8 hours as needed for Nausea 30 tablet 1    triamcinolone (KENALOG) 0.1 % cream Apply topically as needed (itching) Apply topically 2 times daily. CALCIUM PO Take 1 tablet by mouth Daily      amLODIPine (NORVASC) 5 MG tablet TAKE 1 TABLET BY MOUTH EVERY DAY      vitamin D (CHOLECALCIFEROL) 25 MCG (1000 UT) TABS tablet 2,000 Units  in the morning. famotidine (PEPCID) 20 MG tablet Take 20 mg by mouth nightly as needed (reflux)      hydrOXYzine HCl (ATARAX) 25 MG tablet TAKE 1 TABLET (25 MG) BY MOUTH DAILY AS NEEDED FOR ITCHING      omeprazole (PRILOSEC) 20 MG delayed release capsule Take 20 mg by mouth in the morning. (Patient not taking: No sig reported)      simvastatin (ZOCOR) 20 MG tablet Take 20 mg by mouth (Patient not taking: No sig reported)       No current facility-administered medications on file prior to visit. 7 months Status Post left TKA     History: The patient returns today for post-op visit following left TKA. Their pain is improving. They are ambulating without any walking aid. They have completed physical therapy and resumed most of their normal activities. They are pleased with their results thus far.  Denies any new lower R/lower L

## 2023-09-05 ENCOUNTER — TELEPHONE (OUTPATIENT)
Dept: ORTHOPEDIC SURGERY | Age: 83
End: 2023-09-05

## 2023-09-07 ENCOUNTER — OFFICE VISIT (OUTPATIENT)
Dept: ORTHOPEDIC SURGERY | Age: 83
End: 2023-09-07
Payer: MEDICARE

## 2023-09-07 DIAGNOSIS — S81.002A OPEN WOUND OF LEFT KNEE, INITIAL ENCOUNTER: Primary | ICD-10-CM

## 2023-09-07 DIAGNOSIS — S80.262A INSECT BITE OF LEFT KNEE, INITIAL ENCOUNTER: ICD-10-CM
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
## 2023-09-07 PROCEDURE — 99214 OFFICE O/P EST MOD 30 MIN: CPT | Performed by: PHYSICIAN ASSISTANT

## 2023-09-07 PROCEDURE — G8427 DOCREV CUR MEDS BY ELIG CLIN: HCPCS | Performed by: PHYSICIAN ASSISTANT

## 2023-09-07 PROCEDURE — 1036F TOBACCO NON-USER: CPT | Performed by: PHYSICIAN ASSISTANT

## 2023-09-07 PROCEDURE — G8417 CALC BMI ABV UP PARAM F/U: HCPCS | Performed by: PHYSICIAN ASSISTANT

## 2023-09-07 PROCEDURE — 1090F PRES/ABSN URINE INCON ASSESS: CPT | Performed by: PHYSICIAN ASSISTANT

## 2023-09-07 PROCEDURE — G8400 PT W/DXA NO RESULTS DOC: HCPCS | Performed by: PHYSICIAN ASSISTANT

## 2023-09-07 PROCEDURE — 1123F ACP DISCUSS/DSCN MKR DOCD: CPT | Performed by: PHYSICIAN ASSISTANT

## 2023-09-07 RX ORDER — SULFAMETHOXAZOLE AND TRIMETHOPRIM 800; 160 MG/1; MG/1
1 TABLET ORAL 2 TIMES DAILY
Qty: 28 TABLET | Refills: 0 | Status: SHIPPED | OUTPATIENT
Start: 2023-09-07 | End: 2023-09-21

## 2023-09-07 NOTE — PROGRESS NOTES
Post-op TKA Visit      09/07/23     Allergies   Allergen Reactions    Lisinopril Cough    Losartan Cough    Rosuvastatin Myalgia    Simvastatin Myalgia     Current Outpatient Medications on File Prior to Visit   Medication Sig Dispense Refill    methocarbamol (ROBAXIN-750) 750 MG tablet Take 1 tablet by mouth 3 times daily as needed (muscle spasm or cramps) 40 tablet 1    acetaminophen (TYLENOL) 500 MG tablet Take 500 mg by mouth every 6 hours as needed for Pain (breakthrough pain). aspirin EC 81 MG EC tablet Take 1 tablet by mouth in the morning and 1 tablet in the evening. 70 tablet 0    celecoxib (CELEBREX) 200 MG capsule Take 1 capsule by mouth daily as needed for Pain 60 capsule 2    promethazine (PHENERGAN) 25 MG tablet Take 1 tablet by mouth every 8 hours as needed for Nausea 30 tablet 1    triamcinolone (KENALOG) 0.1 % cream Apply topically as needed (itching) Apply topically 2 times daily. CALCIUM PO Take 1 tablet by mouth Daily      amLODIPine (NORVASC) 5 MG tablet TAKE 1 TABLET BY MOUTH EVERY DAY      vitamin D (CHOLECALCIFEROL) 25 MCG (1000 UT) TABS tablet 2,000 Units  in the morning. famotidine (PEPCID) 20 MG tablet Take 20 mg by mouth nightly as needed (reflux)      hydrOXYzine HCl (ATARAX) 25 MG tablet TAKE 1 TABLET (25 MG) BY MOUTH DAILY AS NEEDED FOR ITCHING      omeprazole (PRILOSEC) 20 MG delayed release capsule Take 20 mg by mouth in the morning. (Patient not taking: No sig reported)      simvastatin (ZOCOR) 20 MG tablet Take 20 mg by mouth (Patient not taking: No sig reported)       No current facility-administered medications on file prior to visit. 8 months Status Post left TKA     History: The patient returns today for post-op visit following left TKA. Patient has been doing well in the postoperative period thus far.   She states on Tuesday around 3 or 4 AM she was woken up by a sharp stinging pain on the left knee followed by a small red wound area that has progressed

## 2023-09-12 ENCOUNTER — OFFICE VISIT (OUTPATIENT)
Dept: ORTHOPEDIC SURGERY | Age: 83
End: 2023-09-12
Payer: MEDICARE

## 2023-09-12 DIAGNOSIS — S81.002D OPEN WOUND OF LEFT KNEE, SUBSEQUENT ENCOUNTER: ICD-10-CM

## 2023-09-12 DIAGNOSIS — Z96.652 HX OF TOTAL KNEE ARTHROPLASTY, LEFT: Primary | ICD-10-CM

## 2023-09-12 PROCEDURE — G8400 PT W/DXA NO RESULTS DOC: HCPCS | Performed by: ORTHOPAEDIC SURGERY

## 2023-09-12 PROCEDURE — G8428 CUR MEDS NOT DOCUMENT: HCPCS | Performed by: ORTHOPAEDIC SURGERY

## 2023-09-12 PROCEDURE — 1090F PRES/ABSN URINE INCON ASSESS: CPT | Performed by: ORTHOPAEDIC SURGERY

## 2023-09-12 PROCEDURE — 1036F TOBACCO NON-USER: CPT | Performed by: ORTHOPAEDIC SURGERY

## 2023-09-12 PROCEDURE — 1123F ACP DISCUSS/DSCN MKR DOCD: CPT | Performed by: ORTHOPAEDIC SURGERY

## 2023-09-12 PROCEDURE — 99213 OFFICE O/P EST LOW 20 MIN: CPT | Performed by: ORTHOPAEDIC SURGERY

## 2023-09-12 PROCEDURE — G8417 CALC BMI ABV UP PARAM F/U: HCPCS | Performed by: ORTHOPAEDIC SURGERY

## 2023-09-12 NOTE — PROGRESS NOTES
Name: Melinda Hernandez  YOB: 1940  Gender: female  MRN: [id number]    CC: Follow-up (Wound )       HPI: Melinda Hernandez is a 80 y.o. female who presents with Follow-up (Wound )  The patient returns today for follow-up of reaction on the inferior portion of left knee incision. She has been on oral antibiotics. She denies any knee pain. History was obtained by patient and     ROS/Meds/PSH/PMH/FH/SH: I personally reviewed the patients standard intake form. Current Outpatient Medications:     sulfamethoxazole-trimethoprim (BACTRIM DS;SEPTRA DS) 800-160 MG per tablet, Take 1 tablet by mouth 2 times daily for 14 days, Disp: 28 tablet, Rfl: 0    methocarbamol (ROBAXIN-750) 750 MG tablet, Take 1 tablet by mouth 3 times daily as needed (muscle spasm or cramps), Disp: 40 tablet, Rfl: 1    acetaminophen (TYLENOL) 500 MG tablet, Take 500 mg by mouth every 6 hours as needed for Pain (breakthrough pain). , Disp: , Rfl:     aspirin EC 81 MG EC tablet, Take 1 tablet by mouth in the morning and 1 tablet in the evening., Disp: 70 tablet, Rfl: 0    celecoxib (CELEBREX) 200 MG capsule, Take 1 capsule by mouth daily as needed for Pain, Disp: 60 capsule, Rfl: 2    promethazine (PHENERGAN) 25 MG tablet, Take 1 tablet by mouth every 8 hours as needed for Nausea, Disp: 30 tablet, Rfl: 1    triamcinolone (KENALOG) 0.1 % cream, Apply topically as needed (itching) Apply topically 2 times daily. , Disp: , Rfl:     CALCIUM PO, Take 1 tablet by mouth Daily, Disp: , Rfl:     amLODIPine (NORVASC) 5 MG tablet, TAKE 1 TABLET BY MOUTH EVERY DAY, Disp: , Rfl:     vitamin D (CHOLECALCIFEROL) 25 MCG (1000 UT) TABS tablet, 2,000 Units  in the morning., Disp: , Rfl:     famotidine (PEPCID) 20 MG tablet, Take 20 mg by mouth nightly as needed (reflux), Disp: , Rfl:     hydrOXYzine HCl (ATARAX) 25 MG tablet, TAKE 1 TABLET (25 MG) BY MOUTH DAILY AS NEEDED FOR ITCHING, Disp: , Rfl:     omeprazole (PRILOSEC) 20 MG

## 2023-10-03 ENCOUNTER — OFFICE VISIT (OUTPATIENT)
Dept: ORTHOPEDIC SURGERY | Age: 83
End: 2023-10-03
Payer: MEDICARE

## 2023-10-03 DIAGNOSIS — Z96.652 HX OF TOTAL KNEE ARTHROPLASTY, LEFT: Primary | ICD-10-CM

## 2023-10-03 PROCEDURE — G8428 CUR MEDS NOT DOCUMENT: HCPCS | Performed by: ORTHOPAEDIC SURGERY

## 2023-10-03 PROCEDURE — G8400 PT W/DXA NO RESULTS DOC: HCPCS | Performed by: ORTHOPAEDIC SURGERY

## 2023-10-03 PROCEDURE — G8484 FLU IMMUNIZE NO ADMIN: HCPCS | Performed by: ORTHOPAEDIC SURGERY

## 2023-10-03 PROCEDURE — G8417 CALC BMI ABV UP PARAM F/U: HCPCS | Performed by: ORTHOPAEDIC SURGERY

## 2023-10-03 PROCEDURE — 99212 OFFICE O/P EST SF 10 MIN: CPT | Performed by: ORTHOPAEDIC SURGERY

## 2023-10-03 PROCEDURE — 1090F PRES/ABSN URINE INCON ASSESS: CPT | Performed by: ORTHOPAEDIC SURGERY

## 2023-10-03 PROCEDURE — 1123F ACP DISCUSS/DSCN MKR DOCD: CPT | Performed by: ORTHOPAEDIC SURGERY

## 2023-10-03 PROCEDURE — 1036F TOBACCO NON-USER: CPT | Performed by: ORTHOPAEDIC SURGERY

## 2023-10-03 NOTE — PROGRESS NOTES
simvastatin (ZOCOR) 20 MG tablet, Take 20 mg by mouth (Patient not taking: No sig reported), Disp: , Rfl:    Past Medical History:   Diagnosis Date    HTN (hypertension)     Hyperlipidemia     Urinary incontinence         Physical Examination:  Alert and oriented x3. The stitch abscess appears to have resolved. No surrounding erythema increased warmth. No drainage. Imaging:   None    Assessment:   The patient has a stitch abscess. It appears to be resolving.    3--this is a stable chronic illness/condition    Plan:   As needed

## 2024-02-22 ENCOUNTER — OFFICE VISIT (OUTPATIENT)
Dept: ORTHOPEDIC SURGERY | Age: 84
End: 2024-02-22
Payer: MEDICARE

## 2024-02-22 DIAGNOSIS — Z96.652 HX OF TOTAL KNEE ARTHROPLASTY, LEFT: Primary | ICD-10-CM

## 2024-02-22 PROCEDURE — 1123F ACP DISCUSS/DSCN MKR DOCD: CPT | Performed by: ORTHOPAEDIC SURGERY

## 2024-02-22 PROCEDURE — G8428 CUR MEDS NOT DOCUMENT: HCPCS | Performed by: ORTHOPAEDIC SURGERY

## 2024-02-22 PROCEDURE — G8421 BMI NOT CALCULATED: HCPCS | Performed by: ORTHOPAEDIC SURGERY

## 2024-02-22 PROCEDURE — 99213 OFFICE O/P EST LOW 20 MIN: CPT | Performed by: ORTHOPAEDIC SURGERY

## 2024-02-22 PROCEDURE — 1090F PRES/ABSN URINE INCON ASSESS: CPT | Performed by: ORTHOPAEDIC SURGERY

## 2024-02-22 PROCEDURE — 1036F TOBACCO NON-USER: CPT | Performed by: ORTHOPAEDIC SURGERY

## 2024-02-22 PROCEDURE — G8484 FLU IMMUNIZE NO ADMIN: HCPCS | Performed by: ORTHOPAEDIC SURGERY

## 2024-02-22 PROCEDURE — G8400 PT W/DXA NO RESULTS DOC: HCPCS | Performed by: ORTHOPAEDIC SURGERY

## 2024-02-22 NOTE — PROGRESS NOTES
Post-op TKA Visit      02/22/24     Allergies   Allergen Reactions    Lisinopril Cough    Losartan Cough    Rosuvastatin Myalgia    Simvastatin Myalgia     Current Outpatient Medications on File Prior to Visit   Medication Sig Dispense Refill    methocarbamol (ROBAXIN-750) 750 MG tablet Take 1 tablet by mouth 3 times daily as needed (muscle spasm or cramps) 40 tablet 1    acetaminophen (TYLENOL) 500 MG tablet Take 500 mg by mouth every 6 hours as needed for Pain (breakthrough pain).      aspirin EC 81 MG EC tablet Take 1 tablet by mouth in the morning and 1 tablet in the evening. 70 tablet 0    celecoxib (CELEBREX) 200 MG capsule Take 1 capsule by mouth daily as needed for Pain 60 capsule 2    promethazine (PHENERGAN) 25 MG tablet Take 1 tablet by mouth every 8 hours as needed for Nausea 30 tablet 1    triamcinolone (KENALOG) 0.1 % cream Apply topically as needed (itching) Apply topically 2 times daily.      CALCIUM PO Take 1 tablet by mouth Daily      amLODIPine (NORVASC) 5 MG tablet TAKE 1 TABLET BY MOUTH EVERY DAY      vitamin D (CHOLECALCIFEROL) 25 MCG (1000 UT) TABS tablet 2,000 Units  in the morning.      famotidine (PEPCID) 20 MG tablet Take 20 mg by mouth nightly as needed (reflux)      hydrOXYzine HCl (ATARAX) 25 MG tablet TAKE 1 TABLET (25 MG) BY MOUTH DAILY AS NEEDED FOR ITCHING      omeprazole (PRILOSEC) 20 MG delayed release capsule Take 20 mg by mouth in the morning. (Patient not taking: No sig reported)      simvastatin (ZOCOR) 20 MG tablet Take 20 mg by mouth (Patient not taking: No sig reported)       No current facility-administered medications on file prior to visit.        12 months Status Post left TKA     History: The patient returns today for post-op visit following left TKA.   Their pain is improving. They are ambulating without any walking aid. They have resumed their normal activities.   They are pleased with their results. Denies any new complaints today.     Physical Exam:  The patient's

## 2024-03-16 ENCOUNTER — TRANSCRIBE ORDERS (OUTPATIENT)
Dept: SCHEDULING | Age: 84
End: 2024-03-16

## 2024-03-16 DIAGNOSIS — Z12.31 BREAST CANCER SCREENING BY MAMMOGRAM: Primary | ICD-10-CM

## 2025-05-02 ENCOUNTER — TRANSCRIBE ORDERS (OUTPATIENT)
Dept: SCHEDULING | Age: 85
End: 2025-05-02

## 2025-05-02 DIAGNOSIS — Z12.31 ENCOUNTER FOR SCREENING MAMMOGRAM FOR BREAST CANCER: Primary | ICD-10-CM

## (undated) DEVICE — GLOVE SURG SZ 85 L12IN FNGR THK79MIL GRN LTX FREE

## (undated) DEVICE — GLOVE SURG SZ 7 L11.33IN FNGR THK9.8MIL STRW LTX POLYMER

## (undated) DEVICE — STERILE PRESSURE PROTECTOR PAD® FOR DE MAYO UNIVERSAL DISTRACTOR® (10/CASE): Brand: DE MAYO UNIVERSAL DISTRACTOR®

## (undated) DEVICE — BLADE SURG SAW STD S STL OSC W/ SERR EDGE DISP

## (undated) DEVICE — PIN BNE FIX TEMP L110MM DIA4MM MAKO

## (undated) DEVICE — BLADE RMR L38MM PAT PILOT H

## (undated) DEVICE — SUTURE ABSRB X-1 REV CUT 1/2 CIR 22MM UD BRAID 27IN SZ 3-0 J458H

## (undated) DEVICE — DRAPE,TOP,102X53,STERILE: Brand: MEDLINE

## (undated) DEVICE — KIT INT FIX FEM TIB CKPT MAKOPLASTY

## (undated) DEVICE — SYRINGE MED 50ML LUERLOCK TIP

## (undated) DEVICE — SUTURE ABS ANTIBACT 1-0 CTX 24IN STRATAFIX PDS+ SXPP1A445

## (undated) DEVICE — TOTAL KNEE DR JENNINGS: Brand: MEDLINE INDUSTRIES, INC.

## (undated) DEVICE — 450 ML BOTTLE OF 0.05% CHLORHEXIDINE GLUCONATE IN 99.95% STERILE WATER FOR IRRIGATION, USP AND APPLICATOR.: Brand: IRRISEPT ANTIMICROBIAL WOUND LAVAGE

## (undated) DEVICE — PIN BNE FIX TEMP L140MM DIA4MM MAKO

## (undated) DEVICE — KIT DRP FOR RIO ROBOTIC ARM ASST SYS

## (undated) DEVICE — YANKAUER,FLEXIBLE HANDLE,REGLR CAPACITY: Brand: MEDLINE INDUSTRIES, INC.

## (undated) DEVICE — STERILE PVP: Brand: MEDLINE INDUSTRIES, INC.

## (undated) DEVICE — BIPOLAR SEALER 23-112-1 AQM 6.0: Brand: AQUAMANTYS ®

## (undated) DEVICE — SOLUTION IRRIG 3000ML 0.9% SOD CHL USP UROMATIC PLAS CONT

## (undated) DEVICE — GLOVE ORANGE PI 8 1/2   MSG9085

## (undated) DEVICE — POWDER SURG CELLERATE RX 1 GM HYDROL COLLEGEN

## (undated) DEVICE — SUTURE VCRL SZ 2-0 L18IN ABSRB UD CT-1 L36MM 1/2 CIR J839D

## (undated) DEVICE — KIT TRK KNEE PROC VIZADISC

## (undated) DEVICE — SUTURE ETHBND EXCEL SZ 2 L30IN NONABSORBABLE GRN L75MM LR X496T

## (undated) DEVICE — GLOVE SURG SZ 65 THK91MIL LTX FREE SYN POLYISOPRENE

## (undated) DEVICE — SOLUTION IRRIG 1000ML 0.9% SOD CHL USP POUR PLAS BTL

## (undated) DEVICE — SOLUTION IV 250ML 0.9% SOD CHL PH 5 INJ USP VIAFLX PLAS